# Patient Record
Sex: FEMALE | Race: WHITE | NOT HISPANIC OR LATINO | Employment: OTHER | URBAN - METROPOLITAN AREA
[De-identification: names, ages, dates, MRNs, and addresses within clinical notes are randomized per-mention and may not be internally consistent; named-entity substitution may affect disease eponyms.]

---

## 2020-12-28 ENCOUNTER — CLINICAL SUPPORT (OUTPATIENT)
Dept: AUDIOLOGY | Facility: CLINIC | Age: 85
End: 2020-12-28
Payer: MEDICARE

## 2020-12-28 DIAGNOSIS — H91.90 HEARING LOSS, UNSPECIFIED HEARING LOSS TYPE, UNSPECIFIED LATERALITY: Primary | ICD-10-CM

## 2020-12-28 DIAGNOSIS — H91.90 HEARING LOSS, UNSPECIFIED HEARING LOSS TYPE, UNSPECIFIED LATERALITY: ICD-10-CM

## 2020-12-28 PROCEDURE — 99999 PR PBB SHADOW E&M-EST. PATIENT-LVL I: ICD-10-PCS | Mod: PBBFAC,,, | Performed by: AUDIOLOGIST-HEARING AID FITTER

## 2020-12-28 PROCEDURE — 92557 PR COMPREHENSIVE HEARING TEST: ICD-10-PCS | Mod: S$GLB,,, | Performed by: AUDIOLOGIST-HEARING AID FITTER

## 2020-12-28 PROCEDURE — 99999 PR PBB SHADOW E&M-EST. PATIENT-LVL I: CPT | Mod: PBBFAC,,, | Performed by: AUDIOLOGIST-HEARING AID FITTER

## 2020-12-28 PROCEDURE — 92567 PR TYMPA2METRY: ICD-10-PCS | Mod: S$GLB,,, | Performed by: AUDIOLOGIST-HEARING AID FITTER

## 2020-12-28 PROCEDURE — 92557 COMPREHENSIVE HEARING TEST: CPT | Mod: S$GLB,,, | Performed by: AUDIOLOGIST-HEARING AID FITTER

## 2020-12-28 PROCEDURE — 92567 TYMPANOMETRY: CPT | Mod: S$GLB,,, | Performed by: AUDIOLOGIST-HEARING AID FITTER

## 2020-12-28 NOTE — PROGRESS NOTES
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  Patient complains of hearing loss that has gradually decreased over years. She reports equally sided hearing. No tinnitus. She is accompanied by her daughter who notes the television has been very loud for the past year. No family history of hearing loss. No significant otologic history.     Results reveal a moderate-to-severe sensorineural hearing loss 250-8000 Hz for the right ear, and a mild-to-severe sensorineural hearing loss 250-8000 Hz for the left ear.   Speech Reception Thresholds were 55 dBHL for the right ear and 35 dBHL for the left ear.   Word recognition scores were good for the right ear and good for the left ear.   Tympanograms were Type As for the right ear and Type A for the left ear.    Patient was counseled on the above findings.    Recommendations:  1. Annual audiogram to monitor slight asymmetry  2. Hearing aids, binaural. Patient is provided a copy of her audiogram and notified about [employer].

## 2021-01-29 ENCOUNTER — LAB VISIT (OUTPATIENT)
Dept: FAMILY MEDICINE | Facility: CLINIC | Age: 86
End: 2021-01-29
Payer: MEDICARE

## 2021-01-29 DIAGNOSIS — R05.9 COUGH: ICD-10-CM

## 2021-01-29 PROCEDURE — U0003 INFECTIOUS AGENT DETECTION BY NUCLEIC ACID (DNA OR RNA); SEVERE ACUTE RESPIRATORY SYNDROME CORONAVIRUS 2 (SARS-COV-2) (CORONAVIRUS DISEASE [COVID-19]), AMPLIFIED PROBE TECHNIQUE, MAKING USE OF HIGH THROUGHPUT TECHNOLOGIES AS DESCRIBED BY CMS-2020-01-R: HCPCS

## 2021-01-31 LAB — SARS-COV-2 RNA RESP QL NAA+PROBE: NOT DETECTED

## 2024-05-29 ENCOUNTER — HOSPITAL ENCOUNTER (OUTPATIENT)
Dept: RADIOLOGY | Facility: HOSPITAL | Age: 89
Discharge: HOME | End: 2024-05-29
Attending: STUDENT IN AN ORGANIZED HEALTH CARE EDUCATION/TRAINING PROGRAM
Payer: MEDICARE

## 2024-05-29 ENCOUNTER — OFFICE VISIT (OUTPATIENT)
Dept: RHEUMATOLOGY | Facility: CLINIC | Age: 89
End: 2024-05-29
Payer: MEDICARE

## 2024-05-29 VITALS
DIASTOLIC BLOOD PRESSURE: 68 MMHG | BODY MASS INDEX: 26.49 KG/M2 | WEIGHT: 143.94 LBS | SYSTOLIC BLOOD PRESSURE: 125 MMHG | HEIGHT: 62 IN | HEART RATE: 75 BPM

## 2024-05-29 DIAGNOSIS — M25.50 POLYARTHRALGIA: Primary | ICD-10-CM

## 2024-05-29 DIAGNOSIS — M25.50 POLYARTHRALGIA: ICD-10-CM

## 2024-05-29 DIAGNOSIS — G89.29 CHRONIC PAIN OF BOTH SHOULDERS: ICD-10-CM

## 2024-05-29 DIAGNOSIS — M25.512 CHRONIC PAIN OF BOTH SHOULDERS: ICD-10-CM

## 2024-05-29 DIAGNOSIS — M25.511 CHRONIC PAIN OF BOTH SHOULDERS: ICD-10-CM

## 2024-05-29 DIAGNOSIS — Z79.52 LONG TERM CURRENT USE OF SYSTEMIC STEROIDS: ICD-10-CM

## 2024-05-29 PROCEDURE — 73030 X-RAY EXAM OF SHOULDER: CPT | Mod: 26,50,, | Performed by: RADIOLOGY

## 2024-05-29 PROCEDURE — 77077 JOINT SURVEY SINGLE VIEW: CPT | Mod: 26,,, | Performed by: RADIOLOGY

## 2024-05-29 PROCEDURE — 1157F ADVNC CARE PLAN IN RCRD: CPT | Mod: CPTII,S$GLB,, | Performed by: STUDENT IN AN ORGANIZED HEALTH CARE EDUCATION/TRAINING PROGRAM

## 2024-05-29 PROCEDURE — 73030 X-RAY EXAM OF SHOULDER: CPT | Mod: TC,50

## 2024-05-29 PROCEDURE — 1159F MED LIST DOCD IN RCRD: CPT | Mod: CPTII,S$GLB,, | Performed by: STUDENT IN AN ORGANIZED HEALTH CARE EDUCATION/TRAINING PROGRAM

## 2024-05-29 PROCEDURE — 99205 OFFICE O/P NEW HI 60 MIN: CPT | Mod: S$GLB,,, | Performed by: STUDENT IN AN ORGANIZED HEALTH CARE EDUCATION/TRAINING PROGRAM

## 2024-05-29 PROCEDURE — 77077 JOINT SURVEY SINGLE VIEW: CPT | Mod: TC

## 2024-05-29 PROCEDURE — 1160F RVW MEDS BY RX/DR IN RCRD: CPT | Mod: CPTII,S$GLB,, | Performed by: STUDENT IN AN ORGANIZED HEALTH CARE EDUCATION/TRAINING PROGRAM

## 2024-05-29 PROCEDURE — 99999 PR PBB SHADOW E&M-EST. PATIENT-LVL V: CPT | Mod: PBBFAC,,, | Performed by: STUDENT IN AN ORGANIZED HEALTH CARE EDUCATION/TRAINING PROGRAM

## 2024-05-29 PROCEDURE — 1101F PT FALLS ASSESS-DOCD LE1/YR: CPT | Mod: CPTII,S$GLB,, | Performed by: STUDENT IN AN ORGANIZED HEALTH CARE EDUCATION/TRAINING PROGRAM

## 2024-05-29 PROCEDURE — 1125F AMNT PAIN NOTED PAIN PRSNT: CPT | Mod: CPTII,S$GLB,, | Performed by: STUDENT IN AN ORGANIZED HEALTH CARE EDUCATION/TRAINING PROGRAM

## 2024-05-29 PROCEDURE — 3288F FALL RISK ASSESSMENT DOCD: CPT | Mod: CPTII,S$GLB,, | Performed by: STUDENT IN AN ORGANIZED HEALTH CARE EDUCATION/TRAINING PROGRAM

## 2024-05-29 RX ORDER — CLOPIDOGREL BISULFATE 75 MG/1
75 TABLET ORAL DAILY
COMMUNITY

## 2024-05-29 RX ORDER — PREDNISONE 5 MG/1
1 TABLET ORAL DAILY
COMMUNITY
Start: 2023-10-30 | End: 2024-05-29 | Stop reason: SDUPTHER

## 2024-05-29 RX ORDER — PREDNISONE 5 MG/1
TABLET ORAL
Qty: 150 TABLET | Refills: 1 | Status: SHIPPED | OUTPATIENT
Start: 2024-05-29

## 2024-05-29 RX ORDER — CARVEDILOL 12.5 MG/1
12.5 TABLET ORAL 2 TIMES DAILY
COMMUNITY

## 2024-05-29 RX ORDER — DICLOFENAC SODIUM 10 MG/G
2 GEL TOPICAL 4 TIMES DAILY
Qty: 100 G | Refills: 3 | Status: SHIPPED | OUTPATIENT
Start: 2024-05-29

## 2024-05-29 RX ORDER — ACETAMINOPHEN 500 MG
1000 TABLET ORAL 2 TIMES DAILY
COMMUNITY

## 2024-05-29 RX ORDER — SERTRALINE HYDROCHLORIDE 50 MG/1
50 TABLET, FILM COATED ORAL DAILY
COMMUNITY
Start: 2024-05-07

## 2024-05-29 RX ORDER — AMLODIPINE BESYLATE 2.5 MG/1
2.5 TABLET ORAL DAILY
COMMUNITY

## 2024-05-29 RX ORDER — LOSARTAN POTASSIUM 100 MG/1
100 TABLET ORAL DAILY
COMMUNITY

## 2024-05-29 RX ORDER — ERGOCALCIFEROL 1.25 MG/1
CAPSULE ORAL
COMMUNITY

## 2024-05-29 RX ORDER — ATORVASTATIN CALCIUM 10 MG/1
10 TABLET, FILM COATED ORAL
COMMUNITY
End: 2024-05-29 | Stop reason: ALTCHOICE

## 2024-05-29 RX ORDER — SOTALOL HYDROCHLORIDE 120 MG/1
120 TABLET ORAL 2 TIMES DAILY
COMMUNITY

## 2024-05-29 NOTE — PATIENT INSTRUCTIONS
Try taking Tylenol up to 3000mg daily (from all sources)    Try taking Turmeric (make sure contains black pepper extract)    Try using Voltaren (diclofenac) gel up to four times daily on painful joints

## 2024-05-29 NOTE — PROGRESS NOTES
RHEUMATOLOGY CLINIC INITIAL VISIT    Reason for consult:- polyarthralgias    Chief complaints, HPI, ROS, EXAM, Assessment & Plans:-    Denisa Craft is a 88 y.o. pleasant female who presents to be evaluated for polyarthralgias.  Patient states has had longstanding diagnosis of rheumatoid arthritis.  However she has not been treated with any rheumatoid arthritis medicines.  Has been on varying doses of prednisone for long period of time.  Does seem to help.  Currently she is on prednisone 5 mg daily.  Noticed worsening of her pain especially in her shoulders when she decreased from 10 mg daily.  She does state that she has some stiffness in the morning.  Lasts a couple hours at least.  Pain does seem to get better with activity.  Takes Tylenol which is somewhat helpful.  Of note does have sister who was diagnosed with rheumatoid arthritis.  Today she mainly has pain in her shoulders as well as her neck and arms.  She also has jaw pain.  No visual changes.  No scalp tenderness.  No significant headaches.  No fever or chills.  Has not had bone density.  No falls or fractures.  Rheumatologic review of systems otherwise negative.  No obvious synovitis, dactylitis or enthesitis.  Limited range of motion of shoulders due to pain.  No rashes noted.      Reviewed all available old and outside pertinent medical records.    All lab results personally reviewed and interpreted by me.    1. Polyarthralgia    2. Long term current use of systemic steroids    3. Chronic pain of both shoulders        Problem List Items Addressed This Visit    None  Visit Diagnoses       Polyarthralgia    -  Primary    Relevant Medications    diclofenac sodium (VOLTAREN ARTHRITIS PAIN) 1 % Gel    predniSONE (DELTASONE) 5 MG tablet    Other Relevant Orders    Rheumatoid Factor    Cyclic Citrullinated Peptide Antibody, IgG    C-Reactive Protein    Sedimentation rate    CK    Aldolase    XR Arthritis Survey (Completed)    CBC Auto Differential     Comprehensive Metabolic Panel    HIV 1/2 Ag/Ab (4th Gen)    Hepatitis B surface antigen    HBcAB    Hepatitis B surface antibody    Hepatitis C antibody    Hepatitis A antibody, IgG    Strongyloides IgG Antibodies    Quantiferon Gold TB    X-Ray Shoulder 2 or More views Bilateral (Completed)    Vitamin D    Ambulatory referral/consult to Physical/Occupational Therapy    Long term current use of systemic steroids        Relevant Orders    DXA Bone Density Axial Skeleton 1 or more sites (Completed)    Rheumatoid Factor    Cyclic Citrullinated Peptide Antibody, IgG    C-Reactive Protein    Sedimentation rate    CK    Aldolase    XR Arthritis Survey (Completed)    CBC Auto Differential    Comprehensive Metabolic Panel    HIV 1/2 Ag/Ab (4th Gen)    Hepatitis B surface antigen    HBcAB    Hepatitis B surface antibody    Hepatitis C antibody    Hepatitis A antibody, IgG    Strongyloides IgG Antibodies    Quantiferon Gold TB    X-Ray Shoulder 2 or More views Bilateral (Completed)    Vitamin D    Chronic pain of both shoulders        Relevant Medications    predniSONE (DELTASONE) 5 MG tablet    Other Relevant Orders    Ambulatory referral/consult to Sports Medicine    Ambulatory referral/consult to Physical/Occupational Therapy            Patient presenting to be evaluated for polyarthralgias and previous diagnosis of rheumatoid arthritis  She primarily has pain in her shoulders as well as in her neck and arms.  Has never seen Rheumatology prior  Unclear if her presentation is consistent with rheumatoid arthritis versus osteoarthritis  Some suspicion for polymyalgia rheumatica due to her significant shoulder pain, worse pain in the morning and improvement with prednisone  We will check CBC, CMP, ESR/CRP, RF/CCP, vitamin-D, pre DMARDs as well as CK/aldolase  Obtain x-ray of shoulders bilaterally as well as x-ray arthritis survey  Recommend increasing prednisone to 10 mg and tapering down back to 5 mg over the next  month  Encouraged to use Voltaren gel on painful joints  Referral to physical therapy  Evaluation with ortho/sports Medicine for chronic shoulder pain  may benefit from injections  Bone density    # Follow up in about 3 months (around 8/29/2024).    Chronic comorbid conditions affecting medical decision making today:    Past Medical History:   Diagnosis Date    Coronary artery disease     GERD (gastroesophageal reflux disease)     Hyperlipidemia     Hypertension     Vitamin D deficiency disease        Past Surgical History:   Procedure Laterality Date    abdominal abscess      ANKLE FRACTURE SURGERY Left     APPENDECTOMY      HYSTERECTOMY      INSERTION OF PACEMAKER          Social History     Tobacco Use    Smoking status: Former     Types: Cigarettes    Smokeless tobacco: Never       Family History   Problem Relation Name Age of Onset    Heart disease Mother      Rheum arthritis Sister         Review of patient's allergies indicates:   Allergen Reactions    Penicillins Other (See Comments)       Medication List with Changes/Refills   New Medications    DICLOFENAC SODIUM (VOLTAREN ARTHRITIS PAIN) 1 % GEL    Apply 2 g topically 4 (four) times daily.   Current Medications    ACETAMINOPHEN (TYLENOL) 500 MG TABLET    Take 1,000 mg by mouth 2 (two) times a day.    AMLODIPINE (NORVASC) 2.5 MG TABLET    Take 2.5 mg by mouth once daily.    CARVEDILOL (COREG) 12.5 MG TABLET    Take 12.5 mg by mouth 2 (two) times daily.    CLOPIDOGREL (PLAVIX) 75 MG TABLET    Take 75 mg by mouth once daily.    ERGOCALCIFEROL (ERGOCALCIFEROL) 50,000 UNIT CAP    Take by mouth.    LOSARTAN (COZAAR) 100 MG TABLET    Take 100 mg by mouth once daily.    SERTRALINE (ZOLOFT) 50 MG TABLET    Take 50 mg by mouth once daily.    SOTALOL (BETAPACE) 120 MG TAB    Take 120 mg by mouth 2 (two) times daily.   Changed and/or Refilled Medications    Modified Medication Previous Medication    PREDNISONE (DELTASONE) 5 MG TABLET predniSONE (DELTASONE) 5 MG  tablet       Take 10mg for 2 weeks, then 7.5 mg for 2 weeks and then 5 mg daily thereafter    Take 1 tablet by mouth once daily.   Discontinued Medications    ATORVASTATIN (LIPITOR) 10 MG TABLET    Take 10 mg by mouth.         Disclaimer: This note was prepared using voice recognition system and is likely to have sound alike errors and is not proofread.  Please message me with any questions.    63 minutes of total time spent on the encounter, which includes face to face time and non-face to face time preparing to see the patient (eg, review of tests), Obtaining and/or reviewing separately obtained history, Documenting clinical information in the electronic or other health record, Independently interpreting results (not separately reported) and communicating results to the patient/family/caregiver, or Care coordination (not separately reported).     Thank you for allowing me to participate in the care of Denisa Craft.    Isai Ta MD

## 2024-06-03 DIAGNOSIS — M05.9 SEROPOSITIVE RHEUMATOID ARTHRITIS: Primary | ICD-10-CM

## 2024-06-03 RX ORDER — METHOTREXATE 2.5 MG/1
15 TABLET ORAL
Qty: 24 TABLET | Refills: 3 | Status: SHIPPED | OUTPATIENT
Start: 2024-06-03 | End: 2024-06-06 | Stop reason: SDUPTHER

## 2024-06-03 RX ORDER — FOLIC ACID 1 MG/1
1 TABLET ORAL DAILY
Qty: 90 TABLET | Refills: 3 | Status: SHIPPED | OUTPATIENT
Start: 2024-06-03 | End: 2024-06-06 | Stop reason: SDUPTHER

## 2024-06-04 ENCOUNTER — TELEPHONE (OUTPATIENT)
Dept: RHEUMATOLOGY | Facility: CLINIC | Age: 89
End: 2024-06-04
Payer: MEDICARE

## 2024-06-04 DIAGNOSIS — M81.0 OSTEOPOROSIS, POST-MENOPAUSAL: Primary | ICD-10-CM

## 2024-06-04 DIAGNOSIS — M05.9 SEROPOSITIVE RHEUMATOID ARTHRITIS: ICD-10-CM

## 2024-06-04 NOTE — TELEPHONE ENCOUNTER
----- Message from Isai Ta MD sent at 6/3/2024  3:31 PM CDT -----  Rheumatoid arthritis antibody strongly positive which supports diagnosis of seropositive rheumatoid arthritis.  CRP minimally elevated.  Sed rate is normal.    Recommend starting on methotrexate 15 mg weekly along with daily folic acid.    Eva, would you be able to call and discuss this medication in more depth with the patient?

## 2024-06-04 NOTE — TELEPHONE ENCOUNTER
----- Message from Isai Ta MD sent at 6/4/2024 11:50 AM CDT -----  DEXA shows osteopenia with very high fracture risk.  Would recommend treatment with Prolia every 6 months.    Eva, could you discuss and see if they are amenable to treatment with this?

## 2024-06-05 ENCOUNTER — CLINICAL SUPPORT (OUTPATIENT)
Dept: REHABILITATION | Facility: HOSPITAL | Age: 89
End: 2024-06-05
Attending: STUDENT IN AN ORGANIZED HEALTH CARE EDUCATION/TRAINING PROGRAM
Payer: MEDICARE

## 2024-06-05 DIAGNOSIS — G89.29 CHRONIC PAIN OF BOTH SHOULDERS: ICD-10-CM

## 2024-06-05 DIAGNOSIS — R29.898 UPPER EXTREMITY WEAKNESS: Primary | ICD-10-CM

## 2024-06-05 DIAGNOSIS — M25.511 CHRONIC PAIN OF BOTH SHOULDERS: ICD-10-CM

## 2024-06-05 DIAGNOSIS — M25.50 POLYARTHRALGIA: ICD-10-CM

## 2024-06-05 DIAGNOSIS — M25.612 IMPAIRED RANGE OF MOTION OF BOTH SHOULDERS: ICD-10-CM

## 2024-06-05 DIAGNOSIS — M25.512 CHRONIC PAIN OF BOTH SHOULDERS: ICD-10-CM

## 2024-06-05 DIAGNOSIS — M25.611 IMPAIRED RANGE OF MOTION OF BOTH SHOULDERS: ICD-10-CM

## 2024-06-05 PROCEDURE — 97161 PT EVAL LOW COMPLEX 20 MIN: CPT | Mod: PO

## 2024-06-05 PROCEDURE — 97530 THERAPEUTIC ACTIVITIES: CPT | Mod: PO

## 2024-06-05 NOTE — PLAN OF CARE
[facility]   Physical Therapy Initial Evaluation      Name: Chadd Craft  Clinic Number: [id number]    Therapy Diagnosis:   Encounter Diagnoses   Name Primary?    Polyarthralgia     Chronic pain of both shoulders         Physician: Isai Ta MD    Physician Orders: PT Eval and Treat   Medical Diagnosis from Referral:   M25.50 (ICD-10-CM) - Polyarthralgia   M25.511,G89.29,M25.512 (ICD-10-CM) - Chronic pain of both shoulders     Evaluation Date: 6/5/2024  Authorization Period Expiration: 5/29/2024  Plan of Care Expiration: 7/31/2024  Progress Note Due: 7/5/2024  Date of Surgery: NA  Visit # / Visits authorized: 1/ 1   FOTO: 1/ 3    Precautions: Standard and Fall     Time In: 1510  Time Out: 1600  Total Billable Time: 50 minutes    Subjective   Patient is a poor historian overall    Date of onset: 6 months ago    History of current condition - CHADD reports: a history of B shoulder pain for the past 6 months. She denies any specific NICOLASA to cause the pain. She cannot recall any specific movements to cause the pain but reports her shoulder to be in pain just sitting here for evaluation today.    Falls: None    Imaging: See EPIC:     Prior Therapy: None  Social History: Lives with sister  Occupation: Retired  Prior Level of Function: ind  Current Level of Function: ind    Pain:  Current 5/10, worst 5/10, best 0/10   Location: bilateral shoulders   Description: Aching  Aggravating Factors: unsure  Easing Factors: unsure    Patients goals: Improve comfort     Medical History:   Past Medical History:   Diagnosis Date    Coronary artery disease     GERD (gastroesophageal reflux disease)     Hyperlipidemia     Hypertension     Vitamin D deficiency disease        Surgical History:   Chadd Craft  has a past surgical history that includes Insertion of pacemaker; Hysterectomy; Ankle fracture surgery (Left); abdominal abscess; and Appendectomy.    Medications:   Chadd has a current  medication list which includes the following prescription(s): acetaminophen, amlodipine, carvedilol, clopidogrel, diclofenac sodium, ergocalciferol, folic acid, losartan, methotrexate, prednisone, sertraline, and sotalol.    Allergies:   Review of patient's allergies indicates:   Allergen Reactions    Penicillins Other (See Comments)        Objective          Posture: FHP and increased thoracic kyphosis    Active Range of Motion:   Shoulder Left Right   Flexion 155 150   Abduction 165 165   ER reach T3 T2   IR reach T10 T8     Upper Extremity Strength   (L) UE (R) UE   Shoulder flexion: 4/5 4/5   Shoulder Abduction: 4/5 4/5   Shoulder ER 4/5 4/5   Shoulder IR 4+/5 4+/5         Special Tests:  AC Joint Left Right   Hawkin's Kenndy + +   Neer's Test + +       Joint Mobility:hypomobile    Palpation: TTP B infraspinatus and supraspinatus    Sensation: SILT      Intake Outcome Measure for FOTO shoulder Survey    Therapist reviewed FOTO scores for Chadd Craft on 6/5/2024.   FOTO report - see Media section or FOTO account episode details.    Intake Score: 36% limit         Treatment     Total Treatment time (time-based codes) separate from Evaluation: 15 minutes     CHADD received the treatments listed below:          therapeutic activities to improve functional performance for 15  minutes, including:  Education on HEP to include:  RTC ER isometrics x10 BUE c 3 sec hold  Scapular retractions x30        Patient Education and Home Exercises     Education provided:   - on HEP and POC    Written Home Exercises Provided: yes. Exercises were reviewed and CHADD was able to demonstrate them prior to the end of the session.  CHADD demonstrated good  understanding of the education provided. See EMR under Patient Instructions for exercises provided during therapy sessions.    Assessment     Chadd is a 88 y.o. female referred to outpatient Physical Therapy with a medical diagnosis of   M25.50 (ICD-10-CM) - Polyarthralgia    M25.511,G89.29,M25.512 (ICD-10-CM) - Chronic pain of both shoulders   . Patient presents with increased B shoulder pain and decreased range of motion, strength, and flexibility. These deficits limit the patient from performing everyday activities to include lifting, carrying, bending, reaching, pushing, and pulling without pain or limitations. Pt appears to present with S/S consistent wit B shoulder degenerative changes with mild RTC tendinopathy at this time. Overall, pt with functional range of motion and strength but with mild deficits in each of these areas likely resulting in increased pain. Pt tender to palpation to B infraspinatus and supraspinatus today, suggesting benefit from RTC re-ed. Pt was educate don a HEP to address these deficits and demonstrated good understanding. Due to these deficits, pt will benefit from skilled PT services to improve mobility, control pain, and restore overall function.        Patient prognosis is Fair.   Patient will benefit from skilled outpatient Physical Therapy to address the deficits stated above and in the chart below, provide patient /family education, and to maximize patientt's level of independence.     Plan of care discussed with patient: Yes  Patient's spiritual, cultural and educational needs considered and patient is agreeable to the plan of care and goals as stated below:     Anticipated Barriers for therapy: pmhx    Medical Necessity is demonstrated by the following  History  Co-morbidities and personal factors that may impact the plan of care [] LOW: no personal factors / co-morbidities  [] MODERATE: 1-2 personal factors / co-morbidities  [x] HIGH: 3+ personal factors / co-morbidities    Moderate / High Support Documentation:   Co-morbidities affecting plan of care: pmhx    Personal Factors:   social background     Examination  Body Structures and Functions, activity limitations and participation restrictions that may impact the plan of care [x] LOW:  addressing 1-2 elements  [] MODERATE: 3+ elements  [] HIGH: 4+ elements (please support below)    Moderate / High Support Documentation:      Clinical Presentation [x] LOW: stable  [] MODERATE: Evolving  [] HIGH: Unstable     Decision Making/ Complexity Score: low       Goals:  Short Term Goals: 4 weeks   Pt to report worst pain 3/10 to improve QOL  Pt to improve B shoulder ROM by 10 degs  Pt to improve BUE strength by 1/2 grade  Pt to improve FOTO by 10%    Long Term Goals: 8 weeks   Pt to report worst pain 0/10 to improve QOL  Pt to improve B shoulder ROM by 20 degs  Pt to improve BUE strength by 1 grade  Pt to improve FOTO by 20%  Plan     Plan of care Certification: 6/5/2024 to 7/31/2024.    Outpatient Physical Therapy 1-2 times weekly for 8 weeks to include the following interventions: Cervical/Lumbar Traction, Manual Therapy, Moist Heat/ Ice, Patient Education, Self Care, Therapeutic Activities, and Therapeutic Exercise.     William Solorzano, PT        Physician's Signature: _________________________________________ Date: ________________

## 2024-06-06 RX ORDER — METHOTREXATE 2.5 MG/1
15 TABLET ORAL
Qty: 24 TABLET | Refills: 3 | Status: SHIPPED | OUTPATIENT
Start: 2024-06-06

## 2024-06-06 RX ORDER — FOLIC ACID 1 MG/1
1 TABLET ORAL DAILY
Qty: 90 TABLET | Refills: 3 | Status: SHIPPED | OUTPATIENT
Start: 2024-06-06

## 2024-06-06 NOTE — TELEPHONE ENCOUNTER
Outgoing call to patient's daughter.   Extensive counseling on Prolia, MTX, folic acid.   Patient's daughter requested dispensing pharmacy changed to [facility].   Prolia estimate request sent to [facility]. Awaiting response.

## 2024-06-07 NOTE — TELEPHONE ENCOUNTER
estimate for Prolia: The estimated patient responsibility for the below services are $309.14.   Rx for Prolia routed to [facility].     Of note, patient's daughter wants to ensure mom's dentures fit well etc prior to moving forward w/ Prolia.

## 2024-06-11 ENCOUNTER — CLINICAL SUPPORT (OUTPATIENT)
Dept: REHABILITATION | Facility: HOSPITAL | Age: 89
End: 2024-06-11
Payer: MEDICARE

## 2024-06-11 DIAGNOSIS — M25.612 IMPAIRED RANGE OF MOTION OF BOTH SHOULDERS: ICD-10-CM

## 2024-06-11 DIAGNOSIS — M25.611 IMPAIRED RANGE OF MOTION OF BOTH SHOULDERS: ICD-10-CM

## 2024-06-11 DIAGNOSIS — R29.898 UPPER EXTREMITY WEAKNESS: Primary | ICD-10-CM

## 2024-06-11 PROCEDURE — 97112 NEUROMUSCULAR REEDUCATION: CPT | Mod: PO,CQ

## 2024-06-11 PROCEDURE — 97110 THERAPEUTIC EXERCISES: CPT | Mod: PO,CQ

## 2024-06-11 NOTE — PROGRESS NOTES
Physical Therapy Daily Treatment Note     Name: Chadd [facility] Number: [id number]    Therapy Diagnosis:   Encounter Diagnoses   Name Primary?    Upper extremity weakness Yes    Impaired range of motion of both shoulders      Physician: Isai Ta MD    Visit Date: 6/11/2024    Physician Orders: PT Eval and Treat   Medical Diagnosis from Referral:   M25.50 (ICD-10-CM) - Polyarthralgia   M25.511,G89.29,M25.512 (ICD-10-CM) - Chronic pain of both shoulders      Evaluation Date: 6/5/2024  Authorization Period Expiration: 5/29/2024  Plan of Care Expiration: 7/31/2024  Progress Note Due: 7/5/2024  Date of Surgery: NA  Visit # / Visits authorized: 1/ 20   FOTO: 1/ 3    Time In: 3:50 pm  Time Out: 4:45 pm  Total Billable Time: 25 minutes 1:1    Precautions: Standard and Fall     Subjective     Pt reports: moderate bilateral shoulder pain upon arrival for first follow up. She notes shoulder isometric HEP exercise resulted in min increase in shoulder discomfort/muscular soreness.    She was compliant with home exercise program.  Response to previous treatment: First follow up  Functional change: TBD    Pain: 5/10  Location: bilateral shoulder      Objective     Objective measures displayed on progress notes unless otherwise noted      Treatment      CHADD received therapeutic exercises to develop strength, endurance, ROM, flexibility, posture and core stabilization for 40 minutes including:    Nu step level 1 x 6 minutes  Pulleys x 2 minutes ea (flex/abd)   Supine Shoulder flex AAROM w/dowel 2 x 10  Supine shoulder abd AAROM wdowel 2 x 10  Supine shoulder ER AAROM w/dowel 2 x 10 ea  Supine pec stretch over towel roll x 2 minutes  Supine thoracic ext stretch over towel roll x 2 minutes      CHADD participated in neuromuscular re-education activities to improve: Balance, Coordination, Proprioception and Posture for 10 minutes. The following activities were included:    Scap retraction x 30   Shoulder isometrics  "w/towel x 5 5" hold ea      DENISA received cold pack for 5 minutes to bilateral shoulders.      Home Exercises Provided and Patient Education Provided     Education provided:   - HEP review    Written Home Exercises Provided: Patient instructed to cont prior HEP.  Exercises were reviewed and DENISA was able to demonstrate them prior to the end of the session.  DENISA demonstrated good  understanding of the education provided.     See EMR under Patient Instructions for exercises provided prior visit.    Assessment     Denisa returned for her first follow up visit reporting moderate bilateral shoulder pain levels. Treatment began with HEP review with additional shoulder mobility and periscapular strengthening exercises. Pt required min verbal cueing to ensure proper form on most exercises. Pt noted difficulty performing shoulder isometrics. Will monitor and attempt to progress per pt's tolerance.    DENISA Is progressing well towards her goals.   Pt prognosis is Fair.     Pt will continue to benefit from skilled outpatient physical therapy to address the deficits listed in the problem list box on initial evaluation, provide pt/family education and to maximize pt's level of independence in the home and community environment.     Pt's spiritual, cultural and educational needs considered and pt agreeable to plan of care and goals.     Anticipated barriers to physical therapy: pmhx    Goals:   Short Term Goals: 4 weeks   Pt to report worst pain 3/10 to improve QOL  Pt to improve B shoulder ROM by 10 degs  Pt to improve BUE strength by 1/2 grade  Pt to improve FOTO by 10%     Long Term Goals: 8 weeks   Pt to report worst pain 0/10 to improve QOL  Pt to improve B shoulder ROM by 20 degs  Pt to improve BUE strength by 1 grade  Pt to improve FOTO by 20%    Plan     Plan of care Certification: 6/5/2024 to 7/31/2024.     Outpatient Physical Therapy 1-2 times weekly for 8 weeks to include the following interventions: " Cervical/Lumbar Traction, Manual Therapy, Moist Heat/ Ice, Patient Education, Self Care, Therapeutic Activities, and Therapeutic Exercise.     Bob Sweet, PTA

## 2024-06-14 ENCOUNTER — CLINICAL SUPPORT (OUTPATIENT)
Dept: REHABILITATION | Facility: HOSPITAL | Age: 89
End: 2024-06-14
Payer: MEDICARE

## 2024-06-14 DIAGNOSIS — R29.898 UPPER EXTREMITY WEAKNESS: Primary | ICD-10-CM

## 2024-06-14 DIAGNOSIS — M25.611 IMPAIRED RANGE OF MOTION OF BOTH SHOULDERS: ICD-10-CM

## 2024-06-14 DIAGNOSIS — M25.612 IMPAIRED RANGE OF MOTION OF BOTH SHOULDERS: ICD-10-CM

## 2024-06-14 PROBLEM — M81.0 OSTEOPOROSIS, POST-MENOPAUSAL: Status: ACTIVE | Noted: 2024-06-14

## 2024-06-14 PROCEDURE — 97140 MANUAL THERAPY 1/> REGIONS: CPT | Mod: PO,CQ

## 2024-06-14 PROCEDURE — 97110 THERAPEUTIC EXERCISES: CPT | Mod: PO,CQ

## 2024-06-14 NOTE — PROGRESS NOTES
"Physical Therapy Daily Treatment Note     Name: Chadd Hurst [place]  Clinic Number: [id number]    Therapy Diagnosis:   Encounter Diagnoses   Name Primary?    Upper extremity weakness Yes    Impaired range of motion of both shoulders      Physician: Isai Ta MD    Visit Date: 6/14/2024    Physician Orders: PT Eval and Treat   Medical Diagnosis from Referral:   M25.50 (ICD-10-CM) - Polyarthralgia   M25.511,G89.29,M25.512 (ICD-10-CM) - Chronic pain of both shoulders      Evaluation Date: 6/5/2024  Authorization Period Expiration: 5/29/2024  Plan of Care Expiration: 7/31/2024  Progress Note Due: 7/5/2024  Date of Surgery: NA  Visit # / Visits authorized: 2/ 20   FOTO: 1/ 3    Time In: 1:30 pm  Time Out: 2:30 pm  Total Billable Time: 27 minutes 1:1    Precautions: Standard and Fall     Subjective     Pt reports: slight increase in B shoulder pain level following first follow up visit.     She was compliant with home exercise program.  Response to previous treatment: mod increase in muscular soreness  Functional change: TBD    Pain: 5/10  Location: bilateral shoulder      Objective     Objective measures displayed on progress notes unless otherwise noted      Treatment      CHADD received therapeutic exercises to develop strength, endurance, ROM, flexibility, posture and core stabilization for 30 minutes including:    Nu step level 1 x 6 minutes  Pulleys x 2 minutes ea (flex/abd)   Supine Shoulder flex AAROM w/dowel 2 x 10  Supine shoulder abd AAROM wdowel 2 x 10-NT  Supine shoulder ER AAROM w/dowel 2 x 10 ea  Supine pec stretch over towel roll x 2 minutes  Supine thoracic ext stretch over towel roll x 2 minutes      CHADD participated in neuromuscular re-education activities to improve: Balance, Coordination, Proprioception and Posture for 10 minutes. The following activities were included:    Scap retraction x 30   Shoulder isometrics w/towel x 5 5" hold ea      CHADD received the following manual therapy " techniques: Joint mobilizations and Soft tissue Mobilization were applied to the: B shoulder  for 15 minutes, including:    Gentle G/H distraction w/oscillations  STM to Pec Minor  G/H inferior joint mobs      DENISA received hot pack for 5 minutes to bilateral shoulders.      Home Exercises Provided and Patient Education Provided     Education provided:   - HEP review    Written Home Exercises Provided: Patient instructed to cont prior HEP.  Exercises were reviewed and DENISA was able to demonstrate them prior to the end of the session.  DENISA demonstrated good  understanding of the education provided.     See EMR under Patient Instructions for exercises provided prior visit.    Assessment   Denisa returned reporting slight increase in B shoulder pain levels and muscular soreness following exercise progressions made during first follow up visit. Increased emphasis on pain control with the addition of STM  to pec minor and joint mobilizations to B G/H joints. Pt reported decreased pain levels post treatment. Will monitor and attempt to progress per pt's tolerance.    DENISA Is progressing well towards her goals.   Pt prognosis is Fair.     Pt will continue to benefit from skilled outpatient physical therapy to address the deficits listed in the problem list box on initial evaluation, provide pt/family education and to maximize pt's level of independence in the home and community environment.     Pt's spiritual, cultural and educational needs considered and pt agreeable to plan of care and goals.     Anticipated barriers to physical therapy: pmhx    Goals:   Short Term Goals: 4 weeks   Pt to report worst pain 3/10 to improve QOL  Pt to improve B shoulder ROM by 10 degs  Pt to improve BUE strength by 1/2 grade  Pt to improve FOTO by 10%     Long Term Goals: 8 weeks   Pt to report worst pain 0/10 to improve QOL  Pt to improve B shoulder ROM by 20 degs  Pt to improve BUE strength by 1 grade  Pt to improve FOTO by  20%    Plan     Plan of care Certification: 6/5/2024 to 7/31/2024.     Outpatient Physical Therapy 1-2 times weekly for 8 weeks to include the following interventions: Cervical/Lumbar Traction, Manual Therapy, Moist Heat/ Ice, Patient Education, Self Care, Therapeutic Activities, and Therapeutic Exercise.     Bob Sweet, PTA

## 2024-06-17 NOTE — TELEPHONE ENCOUNTER
Outgoing call to schedule Prolia. Pt to have repeat CMP collected 6/24 @ 0900 and Prolia admin pending labs @ 1030.

## 2024-06-18 ENCOUNTER — CLINICAL SUPPORT (OUTPATIENT)
Dept: REHABILITATION | Facility: HOSPITAL | Age: 89
End: 2024-06-18
Payer: MEDICARE

## 2024-06-18 DIAGNOSIS — R29.898 UPPER EXTREMITY WEAKNESS: Primary | ICD-10-CM

## 2024-06-18 DIAGNOSIS — M25.612 IMPAIRED RANGE OF MOTION OF BOTH SHOULDERS: ICD-10-CM

## 2024-06-18 DIAGNOSIS — M25.611 IMPAIRED RANGE OF MOTION OF BOTH SHOULDERS: ICD-10-CM

## 2024-06-18 PROCEDURE — 97110 THERAPEUTIC EXERCISES: CPT | Mod: PO

## 2024-06-18 PROCEDURE — 97112 NEUROMUSCULAR REEDUCATION: CPT | Mod: PO

## 2024-06-18 NOTE — PROGRESS NOTES
"Physical Therapy Daily Treatment Note     Name: Chadd Hurst [place]  Clinic Number: [id number]    Therapy Diagnosis:   Encounter Diagnoses   Name Primary?    Upper extremity weakness Yes    Impaired range of motion of both shoulders      Physician: Isai Ta MD    Visit Date: 6/18/2024    Physician Orders: PT Eval and Treat   Medical Diagnosis from Referral:   M25.50 (ICD-10-CM) - Polyarthralgia   M25.511,G89.29,M25.512 (ICD-10-CM) - Chronic pain of both shoulders      Evaluation Date: 6/5/2024  Authorization Period Expiration: 5/29/2024  Plan of Care Expiration: 7/31/2024  Progress Note Due: 7/5/2024  Date of Surgery: NA  Visit # / Visits authorized: 3/ 20   FOTO: 1/ 3    Time In: 1600 pm  Time Out: 1658 pm  Total Billable Time: 30 minutes 1:1    Precautions: Standard and Fall     Subjective     Pt reports: slight increase in B shoulder pain as sessions continue    She was compliant with home exercise program.  Response to previous treatment: mod increase in muscular soreness  Functional change: TBD    Pain: 5/10  Location: bilateral shoulder      Objective     Objective measures displayed on progress notes unless otherwise noted      Treatment      CHADD received therapeutic exercises to develop strength, endurance, ROM, flexibility, posture and core stabilization for 30 minutes including:    Nu step level 1 x 6 minutes  Pulleys x 2 minutes ea (flex/abd)   Supine Shoulder flex AAROM w/dowel 2 x 10  Supine shoulder abd AAROM wdowel 2 x 10-NT  Supine shoulder ER AAROM w/dowel 2 x 10 ea  Supine pec stretch over towel roll x 2 minutes  Supine thoracic ext stretch over towel roll x 2 minutes      CHADD participated in neuromuscular re-education activities to improve: Balance, Coordination, Proprioception and Posture for 10 minutes. The following activities were included:    Scap retraction x 30   Shoulder isometrics w/towel x 5 5" hold ea      CHADD received the following manual therapy techniques: Joint " mobilizations and Soft tissue Mobilization were applied to the: B shoulder  for 15 minutes, including:    Gentle G/H distraction w/oscillations  STM to Pec Minor  G/H inferior joint mobs      DENISA received hot pack for 5 minutes to bilateral shoulders.      Home Exercises Provided and Patient Education Provided     Education provided:   - HEP review    Written Home Exercises Provided: Patient instructed to cont prior HEP.  Exercises were reviewed and DENISA was able to demonstrate them prior to the end of the session.  DENISA demonstrated good  understanding of the education provided.     See EMR under Patient Instructions for exercises provided prior visit.    Assessment   Denisa continues to demonstrate signs of improvement as sessions continue with increased tolerance to overhead activities. Pt will continue to benefit from further loading to tolerance going forward to maximize ability to perform ADLs and overhead activities.    DENISA Is progressing well towards her goals.   Pt prognosis is Fair.     Pt will continue to benefit from skilled outpatient physical therapy to address the deficits listed in the problem list box on initial evaluation, provide pt/family education and to maximize pt's level of independence in the home and community environment.     Pt's spiritual, cultural and educational needs considered and pt agreeable to plan of care and goals.     Anticipated barriers to physical therapy: pmhx    Goals:   Short Term Goals: 4 weeks   Pt to report worst pain 3/10 to improve QOL  Pt to improve B shoulder ROM by 10 degs  Pt to improve BUE strength by 1/2 grade  Pt to improve FOTO by 10%     Long Term Goals: 8 weeks   Pt to report worst pain 0/10 to improve QOL  Pt to improve B shoulder ROM by 20 degs  Pt to improve BUE strength by 1 grade  Pt to improve FOTO by 20%    Plan     Plan of care Certification: 6/5/2024 to 7/31/2024.     Outpatient Physical Therapy 1-2 times weekly for 8 weeks to include the  following interventions: Cervical/Lumbar Traction, Manual Therapy, Moist Heat/ Ice, Patient Education, Self Care, Therapeutic Activities, and Therapeutic Exercise.     William Solorzano, PT

## 2024-06-21 ENCOUNTER — CLINICAL SUPPORT (OUTPATIENT)
Dept: REHABILITATION | Facility: HOSPITAL | Age: 89
End: 2024-06-21
Payer: MEDICARE

## 2024-06-21 DIAGNOSIS — M25.612 IMPAIRED RANGE OF MOTION OF BOTH SHOULDERS: ICD-10-CM

## 2024-06-21 DIAGNOSIS — R29.898 UPPER EXTREMITY WEAKNESS: Primary | ICD-10-CM

## 2024-06-21 DIAGNOSIS — M25.611 IMPAIRED RANGE OF MOTION OF BOTH SHOULDERS: ICD-10-CM

## 2024-06-21 PROCEDURE — 97110 THERAPEUTIC EXERCISES: CPT | Mod: PO,CQ

## 2024-06-21 PROCEDURE — 97112 NEUROMUSCULAR REEDUCATION: CPT | Mod: PO,CQ

## 2024-06-21 NOTE — PROGRESS NOTES
"Physical Therapy Daily Treatment Note     Name: Chadd Hurst [place]  Clinic Number: [id number]    Therapy Diagnosis:   Encounter Diagnoses   Name Primary?    Upper extremity weakness Yes    Impaired range of motion of both shoulders      Physician: Isai Ta MD    Visit Date: 6/21/2024    Physician Orders: PT Eval and Treat   Medical Diagnosis from Referral:   M25.50 (ICD-10-CM) - Polyarthralgia   M25.511,G89.29,M25.512 (ICD-10-CM) - Chronic pain of both shoulders      Evaluation Date: 6/5/2024  Authorization Period Expiration: 5/29/2024  Plan of Care Expiration: 7/31/2024  Progress Note Due: 7/5/2024  Date of Surgery: NA  Visit # / Visits authorized: 4/ 20   FOTO: 1/ 3    Time In: 1:30 pm  Time Out: 2:25 pm  Total Billable Time: 25 minutes 1:1    Precautions: Standard and Fall     Subjective     Pt reports: "my shoulders are tender". She does note improved functional shoulder movement at home.    She was compliant with home exercise program.  Response to previous treatment: mod increase in muscular soreness  Functional change: TBD    Pain: 5/10  Location: bilateral shoulder      Objective     Objective measures displayed on progress notes unless otherwise noted      Treatment      CHADD received therapeutic exercises to develop strength, endurance, ROM, flexibility, posture and core stabilization for 35 minutes including:    Nu step level 1 x 6 minutes  Pulleys x 2 minutes ea (flex/abd)   Supine Shoulder flex AAROM w/dowel 2 x 10  Supine shoulder abd AAROM wdowel 2 x 10-NT  Supine shoulder ER AAROM w/dowel 2 x 10 ea  Supine pec stretch over towel roll x 2 minutes  Supine thoracic ext stretch over towel roll x 2 minutes      CHADD participated in neuromuscular re-education activities to improve: Balance, Coordination, Proprioception and Posture for 15 minutes. The following activities were included:    Scap retraction x 30   Shoulder isometrics w/towel x 5 5" hold ea      CHADD received the following manual " therapy techniques: Joint mobilizations and Soft tissue Mobilization were applied to the: B shoulder  for 00 minutes, including:    Gentle G/H distraction w/oscillations  STM to Pec Minor  G/H inferior joint mobs      DENISA received hot pack for 5 minutes to bilateral shoulders.      Home Exercises Provided and Patient Education Provided     Education provided:   - HEP review    Written Home Exercises Provided: Patient instructed to cont prior HEP.  Exercises were reviewed and DENISA was able to demonstrate them prior to the end of the session.  DENISA demonstrated good  understanding of the education provided.     See EMR under Patient Instructions for exercises provided prior visit.    Assessment   Denisa returned reporting continued bilateral shoulder pain levels. Shoulder/periscapular mobility and strengthening exercises continued with pt continuing to require max verbal and tactile cueing to ensure proper form on all exercises. Moist hot pack was applied with pt noting good relief with this intervention. Will continue to progress per pt's tolerance.    DENISA Is progressing well towards her goals.   Pt prognosis is Fair.     Pt will continue to benefit from skilled outpatient physical therapy to address the deficits listed in the problem list box on initial evaluation, provide pt/family education and to maximize pt's level of independence in the home and community environment.     Pt's spiritual, cultural and educational needs considered and pt agreeable to plan of care and goals.     Anticipated barriers to physical therapy: pmhx    Goals:   Short Term Goals: 4 weeks   Pt to report worst pain 3/10 to improve QOL  Pt to improve B shoulder ROM by 10 degs  Pt to improve BUE strength by 1/2 grade  Pt to improve FOTO by 10%     Long Term Goals: 8 weeks   Pt to report worst pain 0/10 to improve QOL  Pt to improve B shoulder ROM by 20 degs  Pt to improve BUE strength by 1 grade  Pt to improve FOTO by 20%    Plan      Plan of care Certification: 6/5/2024 to 7/31/2024.     Outpatient Physical Therapy 1-2 times weekly for 8 weeks to include the following interventions: Cervical/Lumbar Traction, Manual Therapy, Moist Heat/ Ice, Patient Education, Self Care, Therapeutic Activities, and Therapeutic Exercise.     Bob Sweet, PTA

## 2024-06-24 ENCOUNTER — CLINICAL SUPPORT (OUTPATIENT)
Dept: RHEUMATOLOGY | Facility: CLINIC | Age: 89
End: 2024-06-24
Payer: MEDICARE

## 2024-06-24 ENCOUNTER — LAB VISIT (OUTPATIENT)
Dept: LAB | Facility: HOSPITAL | Age: 89
End: 2024-06-24
Attending: STUDENT IN AN ORGANIZED HEALTH CARE EDUCATION/TRAINING PROGRAM
Payer: MEDICARE

## 2024-06-24 ENCOUNTER — TELEPHONE (OUTPATIENT)
Dept: RHEUMATOLOGY | Facility: CLINIC | Age: 89
End: 2024-06-24
Payer: MEDICARE

## 2024-06-24 DIAGNOSIS — M05.9 SEROPOSITIVE RHEUMATOID ARTHRITIS: Primary | ICD-10-CM

## 2024-06-24 DIAGNOSIS — M81.0 OSTEOPOROSIS, POST-MENOPAUSAL: ICD-10-CM

## 2024-06-24 DIAGNOSIS — M25.50 POLYARTHRALGIA: ICD-10-CM

## 2024-06-24 LAB
ALBUMIN SERPL BCP-MCNC: 3.2 G/DL (ref 3.5–5.2)
ALP SERPL-CCNC: 56 U/L (ref 55–135)
ALT SERPL W/O P-5'-P-CCNC: 9 U/L (ref 10–44)
ANION GAP SERPL CALC-SCNC: 10 MMOL/L (ref 8–16)
AST SERPL-CCNC: 13 U/L (ref 10–40)
BILIRUB SERPL-MCNC: 0.3 MG/DL (ref 0.1–1)
BUN SERPL-MCNC: 13 MG/DL (ref 8–23)
CALCIUM SERPL-MCNC: 9.2 MG/DL (ref 8.7–10.5)
CHLORIDE SERPL-SCNC: 106 MMOL/L (ref 95–110)
CO2 SERPL-SCNC: 24 MMOL/L (ref 23–29)
CREAT SERPL-MCNC: 0.7 MG/DL (ref 0.5–1.4)
EST. GFR  (NO RACE VARIABLE): >60 ML/MIN/1.73 M^2
GLUCOSE SERPL-MCNC: 94 MG/DL (ref 70–110)
POTASSIUM SERPL-SCNC: 4.1 MMOL/L (ref 3.5–5.1)
PROT SERPL-MCNC: 6.2 G/DL (ref 6–8.4)
SODIUM SERPL-SCNC: 140 MMOL/L (ref 136–145)

## 2024-06-24 PROCEDURE — 80053 COMPREHEN METABOLIC PANEL: CPT | Performed by: STUDENT IN AN ORGANIZED HEALTH CARE EDUCATION/TRAINING PROGRAM

## 2024-06-24 PROCEDURE — 96372 THER/PROPH/DIAG INJ SC/IM: CPT | Mod: S$GLB,,, | Performed by: PHARMACIST

## 2024-06-24 PROCEDURE — 36415 COLL VENOUS BLD VENIPUNCTURE: CPT | Performed by: STUDENT IN AN ORGANIZED HEALTH CARE EDUCATION/TRAINING PROGRAM

## 2024-06-24 RX ORDER — CELECOXIB 100 MG/1
100 CAPSULE ORAL DAILY
Qty: 30 CAPSULE | Refills: 0 | Status: SHIPPED | OUTPATIENT
Start: 2024-06-24

## 2024-06-24 NOTE — TELEPHONE ENCOUNTER
----- Message from Trinidad Lainez sent at 6/24/2024  9:54 AM CDT -----  Regarding: Patient advice                Name of Who is Calling:    Analilia Randall  (Patient's Daughter)    Who Left The message:    Analilia Randall  (Patient's Daughter)        What is the request in detail:     Patient's daughter called requesting a call regarding her Mother's appointment with the nurse today 06/24/2024.    Please further advise.    Thank you      Reply by [facility]:  YES      Preferred Call Back :  [phone] (m)

## 2024-06-24 NOTE — PROGRESS NOTES
Prolia 60 mg q 6 months  Last dose given- first dose       Any invasive dental procedures in past 3 months or upcoming 3 months: no     Last Rheumatology provider visit- 5/29/24     Recent labs? Date: 06/24/2024      Calcium: 9.2    Vitamin D: 34 on 5/29/24   SCr/CrCl: 0.7      NDC: 00860-500-49  Lot #- [id number]              Expiration Date- 06/30/2026         Prolia 60 mg/ml administered SQ to lower left quadrant. Tolerated without any complaints. No redness, swelling, or drainage noted to site. Instructed to remain in clinic 15 minutes after administration to monitor for any sign/symptom of reaction. Patient instructed on signs and symptoms of reaction to report. Verbalizes understanding.

## 2024-06-24 NOTE — TELEPHONE ENCOUNTER
Outgoing call to patient's daughter. They were informed the lab would take 24 hours.     Outgoing call to lab to update priority to stat. Informed there is a 1 hour turnaround for the hospital lab once kenia Pickering finds the sample.     Outgoing call to patient's daughter re: will call in for appt once the CMP results.

## 2024-06-24 NOTE — TELEPHONE ENCOUNTER
Calcium: 9.2   OK to administer Prolia. Outgoing call to patient's daughter to inform. Left vm.

## 2024-06-25 ENCOUNTER — CLINICAL SUPPORT (OUTPATIENT)
Dept: REHABILITATION | Facility: HOSPITAL | Age: 89
End: 2024-06-25
Payer: MEDICARE

## 2024-06-25 DIAGNOSIS — R29.898 UPPER EXTREMITY WEAKNESS: Primary | ICD-10-CM

## 2024-06-25 DIAGNOSIS — M25.612 IMPAIRED RANGE OF MOTION OF BOTH SHOULDERS: ICD-10-CM

## 2024-06-25 DIAGNOSIS — M25.611 IMPAIRED RANGE OF MOTION OF BOTH SHOULDERS: ICD-10-CM

## 2024-06-25 PROCEDURE — 97140 MANUAL THERAPY 1/> REGIONS: CPT | Mod: PO

## 2024-06-25 PROCEDURE — 97112 NEUROMUSCULAR REEDUCATION: CPT | Mod: PO

## 2024-06-25 PROCEDURE — 97110 THERAPEUTIC EXERCISES: CPT | Mod: PO

## 2024-06-25 NOTE — PROGRESS NOTES
"Physical Therapy Daily Treatment Note     Name: Chadd Hurst [place]  Clinic Number: [id number]    Therapy Diagnosis:   Encounter Diagnoses   Name Primary?    Upper extremity weakness Yes    Impaired range of motion of both shoulders      Physician: Isai Ta MD    Visit Date: 6/25/2024    Physician Orders: PT Eval and Treat   Medical Diagnosis from Referral:   M25.50 (ICD-10-CM) - Polyarthralgia   M25.511,G89.29,M25.512 (ICD-10-CM) - Chronic pain of both shoulders      Evaluation Date: 6/5/2024  Authorization Period Expiration: 5/29/2024  Plan of Care Expiration: 7/31/2024  Progress Note Due: 7/5/2024  Date of Surgery: NA  Visit # / Visits authorized: 5/ 20   FOTO: 1/ 3    Time In: 1:00 pm  Time Out: 1:53 pm  Total Billable Time: 53 minutes 1:1    Precautions: Standard and Fall     Subjective     Pt reports: continued B shoulder pain and feels they are "bone on bone."    She was compliant with home exercise program.  Response to previous treatment: mod increase in muscular soreness  Functional change: TBD    Pain: 5/10  Location: bilateral shoulder      Objective     Objective measures displayed on progress notes unless otherwise noted      Treatment      CHADD received therapeutic exercises to develop strength, endurance, ROM, flexibility, posture and core stabilization for 28 minutes including:    Nu step level 1 x 6 minutes  Pulleys x 2 minutes ea (flex/abd)   Supine Shoulder flex AAROM w/dowel 2 x 10  Supine shoulder abd AAROM wdowel 2 x 10-NT  Supine shoulder ER AAROM w/dowel 2 x 10 ea  Supine pec stretch over towel roll x 2 minutes  Supine thoracic ext stretch over towel roll x 2 minutes  Supine serratus punch 2x10 3# dowel      CHADD participated in neuromuscular re-education activities to improve: Balance, Coordination, Proprioception and Posture for 15 minutes. The following activities were included:    Scap retraction x 30   Shoulder isometrics w/towel x 5 5" hold ea  Landmine press c 3# dowel " 3x10      CHADD received the following manual therapy techniques: Joint mobilizations and Soft tissue Mobilization were applied to the: B shoulder  for 10 minutes, including:    Gentle G/H distraction w/oscillations  STM to Pec Minor  G/H inferior joint mobs      CHADD received hot pack for 0 minutes to bilateral shoulders.      Home Exercises Provided and Patient Education Provided     Education provided:   - HEP review    Written Home Exercises Provided: Patient instructed to cont prior HEP.  Exercises were reviewed and CHADD was able to demonstrate them prior to the end of the session.  CHADD demonstrated good  understanding of the education provided.     See EMR under Patient Instructions for exercises provided prior visit.    Assessment   Pt continues to be limited by B shoulder pain even with very light mat level exercises without resistance. At this time, progressions limited by tolerance to loading. Pt will benefit from re-assessment at upcoming visit to determining meeting of goals and will follow back up with MD if goals not met.    CHADD Is progressing well towards her goals.   Pt prognosis is Fair.     Pt will continue to benefit from skilled outpatient physical therapy to address the deficits listed in the problem list box on initial evaluation, provide pt/family education and to maximize pt's level of independence in the home and community environment.     Pt's spiritual, cultural and educational needs considered and pt agreeable to plan of care and goals.     Anticipated barriers to physical therapy: pmhx    Goals:   Short Term Goals: 4 weeks   Pt to report worst pain 3/10 to improve QOL  Pt to improve B shoulder ROM by 10 degs  Pt to improve BUE strength by 1/2 grade  Pt to improve FOTO by 10%     Long Term Goals: 8 weeks   Pt to report worst pain 0/10 to improve QOL  Pt to improve B shoulder ROM by 20 degs  Pt to improve BUE strength by 1 grade  Pt to improve FOTO by 20%    Plan     Plan of  care Certification: 6/5/2024 to 7/31/2024.     Outpatient Physical Therapy 1-2 times weekly for 8 weeks to include the following interventions: Cervical/Lumbar Traction, Manual Therapy, Moist Heat/ Ice, Patient Education, Self Care, Therapeutic Activities, and Therapeutic Exercise.     William Solorzano, PT

## 2024-07-02 ENCOUNTER — CLINICAL SUPPORT (OUTPATIENT)
Dept: REHABILITATION | Facility: HOSPITAL | Age: 89
End: 2024-07-02
Payer: MEDICARE

## 2024-07-02 DIAGNOSIS — M25.612 IMPAIRED RANGE OF MOTION OF BOTH SHOULDERS: ICD-10-CM

## 2024-07-02 DIAGNOSIS — M25.611 IMPAIRED RANGE OF MOTION OF BOTH SHOULDERS: ICD-10-CM

## 2024-07-02 DIAGNOSIS — R29.898 UPPER EXTREMITY WEAKNESS: Primary | ICD-10-CM

## 2024-07-02 PROCEDURE — 97110 THERAPEUTIC EXERCISES: CPT | Mod: PO,CQ

## 2024-07-02 PROCEDURE — 97140 MANUAL THERAPY 1/> REGIONS: CPT | Mod: PO,CQ

## 2024-07-02 NOTE — PROGRESS NOTES
"Physical Therapy Daily Treatment Note     Name: Chadd Hurst [place]  Clinic Number: [id number]    Therapy Diagnosis:   Encounter Diagnoses   Name Primary?    Upper extremity weakness Yes    Impaired range of motion of both shoulders      Physician: Isai Ta MD    Visit Date: 7/2/2024    Physician Orders: PT Eval and Treat   Medical Diagnosis from Referral:   M25.50 (ICD-10-CM) - Polyarthralgia   M25.511,G89.29,M25.512 (ICD-10-CM) - Chronic pain of both shoulders      Evaluation Date: 6/5/2024  Authorization Period Expiration: 5/29/2024  Plan of Care Expiration: 7/31/2024  Progress Note Due: 7/5/2024  Date of Surgery: NA  Visit # / Visits authorized: 6/ 20   FOTO: 1/ 3    Time In: 2:25 pm (late arrival)  Time Out: 3:10 pm  Total Billable Time: 45 minutes     Precautions: Standard and Fall     Subjective     Pt reports: continued B shoulder pain, stating "it still hurts but I'm tolerating it".    She was compliant with home exercise program.  Response to previous treatment: mod increase in muscular soreness  Functional change: TBD    Pain: 5/10  Location: bilateral shoulder      Objective     Objective measures displayed on progress notes unless otherwise noted      Treatment      CHADD received therapeutic exercises to develop strength, endurance, ROM, flexibility, posture and core stabilization for 30 minutes including:    Nu step level 1 x 3 minutes  Pulleys x 2 minutes ea (flex/abd)   Supine Shoulder flex AAROM w/dowel 2 x 10 ( increased pain today)  Supine shoulder abd AAROM wdowel 2 x 10-NT  Supine shoulder ER AAROM w/dowel 2 x 10 ea  Supine pec stretch over towel roll x 2 minutes  Supine thoracic ext stretch over towel roll x 2 minutes  Supine serratus punch 2x10 3# dowel      CHADD participated in neuromuscular re-education activities to improve: Balance, Coordination, Proprioception and Posture for 5 minutes. The following activities were included:    Scap retraction x 30   Shoulder isometrics w/towel x " "5 5" hold ea-NP  Landmine press c 3# dowel 3x10-NP      CHADD received the following manual therapy techniques: Joint mobilizations and Soft tissue Mobilization were applied to the: B shoulder  for 10 minutes, including:    Gentle G/H distraction w/oscillations  STM to Pec Minor  G/H inferior joint mobs      CHADD received hot pack for 0 minutes to bilateral shoulders.      Home Exercises Provided and Patient Education Provided     Education provided:   - HEP review    Written Home Exercises Provided: Patient instructed to cont prior HEP.  Exercises were reviewed and CHADD was able to demonstrate them prior to the end of the session.  CHADD demonstrated good  understanding of the education provided.     See EMR under Patient Instructions for exercises provided prior visit.    Assessment   Treatment was altered today due to pt's late arrival time. A number of exercises were not performed due to time constraint. Pt continues to be limited by bilateral shoulder with continued gentle shoulder AAROM and periscapular activation exercises. Pt experienced increased shoulder pain with AAROM shoulder flex and only was able to perform one set of 10. Will monitor and attempt to progress per pt's tolerance.    CHADD Is progressing well towards her goals.   Pt prognosis is Fair.     Pt will continue to benefit from skilled outpatient physical therapy to address the deficits listed in the problem list box on initial evaluation, provide pt/family education and to maximize pt's level of independence in the home and community environment.     Pt's spiritual, cultural and educational needs considered and pt agreeable to plan of care and goals.     Anticipated barriers to physical therapy: pmhx    Goals:   Short Term Goals: 4 weeks   Pt to report worst pain 3/10 to improve QOL  Pt to improve B shoulder ROM by 10 degs  Pt to improve BUE strength by 1/2 grade  Pt to improve FOTO by 10%     Long Term Goals: 8 weeks   Pt to report " worst pain 0/10 to improve QOL  Pt to improve B shoulder ROM by 20 degs  Pt to improve BUE strength by 1 grade  Pt to improve FOTO by 20%    Plan     Plan of care Certification: 6/5/2024 to 7/31/2024.     Outpatient Physical Therapy 1-2 times weekly for 8 weeks to include the following interventions: Cervical/Lumbar Traction, Manual Therapy, Moist Heat/ Ice, Patient Education, Self Care, Therapeutic Activities, and Therapeutic Exercise.     Bob Sweet, PTA

## 2024-07-12 ENCOUNTER — CLINICAL SUPPORT (OUTPATIENT)
Dept: REHABILITATION | Facility: HOSPITAL | Age: 89
End: 2024-07-12
Payer: MEDICARE

## 2024-07-12 DIAGNOSIS — M25.612 IMPAIRED RANGE OF MOTION OF BOTH SHOULDERS: ICD-10-CM

## 2024-07-12 DIAGNOSIS — R29.898 UPPER EXTREMITY WEAKNESS: Primary | ICD-10-CM

## 2024-07-12 DIAGNOSIS — M25.611 IMPAIRED RANGE OF MOTION OF BOTH SHOULDERS: ICD-10-CM

## 2024-07-12 PROCEDURE — 97110 THERAPEUTIC EXERCISES: CPT | Mod: PO,CQ

## 2024-07-12 PROCEDURE — 97140 MANUAL THERAPY 1/> REGIONS: CPT | Mod: PO,CQ

## 2024-07-12 NOTE — PROGRESS NOTES
"Physical Therapy Daily Treatment Note     Name: Chadd Hurst [place]  Clinic Number: [id number]    Therapy Diagnosis:   Encounter Diagnoses   Name Primary?    Upper extremity weakness Yes    Impaired range of motion of both shoulders      Physician: Isai Ta MD    Visit Date: 7/12/2024    Physician Orders: PT Eval and Treat   Medical Diagnosis from Referral:   M25.50 (ICD-10-CM) - Polyarthralgia   M25.511,G89.29,M25.512 (ICD-10-CM) - Chronic pain of both shoulders      Evaluation Date: 6/5/2024  Authorization Period Expiration: 5/29/2024  Plan of Care Expiration: 7/31/2024  Progress Note Due: 7/5/2024  Date of Surgery: NA  Visit # / Visits authorized: 7/ 20   FOTO: 1/ 3    Time In: 1:30 pm   Time Out: 2:30 pm  Total Billable Time: 50 minutes     Precautions: Standard and Fall     Subjective     Pt reports: continued B shoulder pain levels    She was compliant with home exercise program.  Response to previous treatment: mod increase in muscular soreness  Functional change: TBD    Pain: 5/10  Location: bilateral shoulder      Objective     Objective measures displayed on progress notes unless otherwise noted      Treatment      CHADD received therapeutic exercises to develop strength, endurance, ROM, flexibility, posture and core stabilization for 25 minutes including:    Nu step level 1 x 6 minutes  Pulleys x 2 minutes ea (flex/abd)   Supine Shoulder flex AAROM w/dowel 2 x 10   Supine shoulder abd AAROM wdowel 2 x 10-NT  Supine shoulder ER AAROM w/dowel 2 x 10 ea  Supine pec stretch over towel roll x 2 minutes  Supine thoracic ext stretch over towel roll x 2 minutes  Supine serratus punch 2x10 3# dowel-NT      CHADD participated in neuromuscular re-education activities to improve: Balance, Coordination, Proprioception and Posture for 5 minutes. The following activities were included:    Scap retraction x 30   Shoulder isometrics w/towel x 5 5" hold ea-NP  Landmine press c 3# dowel 3x10-NP      CHADD received " the following manual therapy techniques: Joint mobilizations and Soft tissue Mobilization were applied to the: B shoulder  for 20 minutes, including:    Gentle G/H distraction w/oscillations  STM to Pec Minor/anterior deltoid  G/H inferior joint mobs      DENISA received hot pack for 0 minutes to bilateral shoulders.      Home Exercises Provided and Patient Education Provided     Education provided:   - HEP review    Written Home Exercises Provided: Patient instructed to cont prior HEP.  Exercises were reviewed and DENISA was able to demonstrate them prior to the end of the session.  DENISA demonstrated good  understanding of the education provided.     See EMR under Patient Instructions for exercises provided prior visit.    Assessment   Denisa returned reporting no change in bilateral shoulder pain levels. Treatment continued with emphasis on shoulder/periscapular mobility and strengthening. Pt experienced increased pain while attempting AAROM shoulder exercises and supine serratus wall slides. Increased time was spent on manual therapy techniques in effort to decrease pain levels and improve shoulder mobility. Pt was educated on the importance of HEP compliance to optimize results of Pt. Will monitor and attempt to progress per pt's tolerance.    DENISA Is progressing well towards her goals.   Pt prognosis is Fair.     Pt will continue to benefit from skilled outpatient physical therapy to address the deficits listed in the problem list box on initial evaluation, provide pt/family education and to maximize pt's level of independence in the home and community environment.     Pt's spiritual, cultural and educational needs considered and pt agreeable to plan of care and goals.     Anticipated barriers to physical therapy: pmhx    Goals:   Short Term Goals: 4 weeks   Pt to report worst pain 3/10 to improve QOL  Pt to improve B shoulder ROM by 10 degs  Pt to improve BUE strength by 1/2 grade  Pt to improve FOTO by  10%     Long Term Goals: 8 weeks   Pt to report worst pain 0/10 to improve QOL  Pt to improve B shoulder ROM by 20 degs  Pt to improve BUE strength by 1 grade  Pt to improve FOTO by 20%    Plan     Plan of care Certification: 6/5/2024 to 7/31/2024.     Outpatient Physical Therapy 1-2 times weekly for 8 weeks to include the following interventions: Cervical/Lumbar Traction, Manual Therapy, Moist Heat/ Ice, Patient Education, Self Care, Therapeutic Activities, and Therapeutic Exercise.     Bob Sweet, PTA

## 2024-07-16 ENCOUNTER — CLINICAL SUPPORT (OUTPATIENT)
Dept: REHABILITATION | Facility: HOSPITAL | Age: 89
End: 2024-07-16
Payer: MEDICARE

## 2024-07-16 DIAGNOSIS — M25.611 IMPAIRED RANGE OF MOTION OF BOTH SHOULDERS: ICD-10-CM

## 2024-07-16 DIAGNOSIS — M25.612 IMPAIRED RANGE OF MOTION OF BOTH SHOULDERS: ICD-10-CM

## 2024-07-16 DIAGNOSIS — R29.898 UPPER EXTREMITY WEAKNESS: Primary | ICD-10-CM

## 2024-07-16 PROCEDURE — 97110 THERAPEUTIC EXERCISES: CPT | Mod: KX,PO

## 2024-07-16 PROCEDURE — 97112 NEUROMUSCULAR REEDUCATION: CPT | Mod: KX,PO

## 2024-07-16 NOTE — PROGRESS NOTES
Physical Therapy Re-assessment     Name: Chadd Hurst [facility] Number: [id number]    Therapy Diagnosis:   Encounter Diagnoses   Name Primary?    Upper extremity weakness Yes    Impaired range of motion of both shoulders      Physician: Isai Ta MD    Visit Date: 7/16/2024    Physician Orders: PT Eval and Treat   Medical Diagnosis from Referral:   M25.50 (ICD-10-CM) - Polyarthralgia   M25.511,G89.29,M25.512 (ICD-10-CM) - Chronic pain of both shoulders      Evaluation Date: 6/5/2024  Authorization Period Expiration: 5/29/2024  Plan of Care Expiration: 7/31/2024  Progress Note Due: 7/31/2024  Date of Surgery: NA  Visit # / Visits authorized: 8/ 20   FOTO: 1/ 3    Time In: 2:00 pm   Time Out: 2:55 pm  Total Billable Time: 55 minutes     Precautions: Standard and Fall     Subjective     Pt reports: continued B shoulder pain levels but states her shoulders are feeling better today    She was compliant with home exercise program.  Response to previous treatment: mod increase in muscular soreness  Functional change: TBD    Pain: 3/10  Location: bilateral shoulder      Objective     Objective measures displayed on progress notes unless otherwise noted      Active Range of Motion:   Shoulder Left Right   Flexion 160 155   Abduction 170 165   ER reach T3 T2   IR reach T10 T8      Upper Extremity Strength    (L) UE (R) UE   Shoulder flexion: 4/5 4/5   Shoulder Abduction: 4/5 4/5   Shoulder ER 4/5 4/5   Shoulder IR 4+/5 4+/5        Treatment      CHADD received therapeutic exercises to develop strength, endurance, ROM, flexibility, posture and core stabilization for 40 minutes including:    Nu step level 1 x 6 minutes  Pulleys x 2 minutes ea (flex/abd)   Supine Shoulder flex AAROM w/dowel 2 x 10   Supine shoulder abd AAROM wdowel 2 x 10-NT  Supine shoulder ER AAROM w/dowel 2 x 10 ea  Supine pec stretch over towel roll x 2 minutes  Supine thoracic ext stretch over towel roll x 2 minutes  Supine serratus punch 2x10 3#  "dowel-NT      CHADD participated in neuromuscular re-education activities to improve: Balance, Coordination, Proprioception and Posture for 15 minutes. The following activities were included:    Scap retraction x 30   Shoulder isometrics w/towel x 5 5" hold ea-NP  Landmine press c 3# dowel 3x10-NP      CHADD received the following manual therapy techniques: Joint mobilizations and Soft tissue Mobilization were applied to the: B shoulder  for 0 minutes, including:    Gentle G/H distraction w/oscillations  STM to Pec Minor/anterior deltoid  G/H inferior joint mobs      CHADD received hot pack for 0 minutes to bilateral shoulders.      Home Exercises Provided and Patient Education Provided     Education provided:   - HEP review    Written Home Exercises Provided: Patient instructed to cont prior HEP.  Exercises were reviewed and CHADD was able to demonstrate them prior to the end of the session.  CHADD demonstrated good  understanding of the education provided.     See EMR under Patient Instructions for exercises provided prior visit.    Assessment   Re-assessment performed today. Pt with mild improvements in range of motion as a result of current POC. Despite this, she continues to present with decreased B shoulder range of motion and BUE strength that continues to require skilled PT intervention. Patient will continue to benefit from loading and progressions to tolerance going forward to improve overall function.    CHADD Is progressing well towards her goals.   Pt prognosis is Fair.     Pt will continue to benefit from skilled outpatient physical therapy to address the deficits listed in the problem list box on initial evaluation, provide pt/family education and to maximize pt's level of independence in the home and community environment.     Pt's spiritual, cultural and educational needs considered and pt agreeable to plan of care and goals.     Anticipated barriers to physical therapy: pmhx    Goals:   Short " Term Goals: 4 weeks   Pt to report worst pain 3/10 to improve QOL-MET 7/16  Pt to improve B shoulder ROM by 10 degs  Pt to improve BUE strength by 1/2 grade  Pt to improve FOTO by 10%     Long Term Goals: 8 weeks   Pt to report worst pain 0/10 to improve QOL  Pt to improve B shoulder ROM by 20 degs  Pt to improve BUE strength by 1 grade  Pt to improve FOTO by 20%    Plan     Plan of care Certification: 6/5/2024 to 7/31/2024.     Outpatient Physical Therapy 1-2 times weekly for 8 weeks to include the following interventions: Cervical/Lumbar Traction, Manual Therapy, Moist Heat/ Ice, Patient Education, Self Care, Therapeutic Activities, and Therapeutic Exercise.     William Solorzano, PT

## 2024-07-22 DIAGNOSIS — M05.9 SEROPOSITIVE RHEUMATOID ARTHRITIS: ICD-10-CM

## 2024-07-22 DIAGNOSIS — M25.50 POLYARTHRALGIA: ICD-10-CM

## 2024-07-22 RX ORDER — CELECOXIB 100 MG/1
CAPSULE ORAL
Qty: 30 CAPSULE | Refills: 5 | Status: SHIPPED | OUTPATIENT
Start: 2024-07-22

## 2024-07-23 ENCOUNTER — CLINICAL SUPPORT (OUTPATIENT)
Dept: REHABILITATION | Facility: HOSPITAL | Age: 89
End: 2024-07-23
Payer: MEDICARE

## 2024-07-23 DIAGNOSIS — M25.611 IMPAIRED RANGE OF MOTION OF BOTH SHOULDERS: ICD-10-CM

## 2024-07-23 DIAGNOSIS — M25.612 IMPAIRED RANGE OF MOTION OF BOTH SHOULDERS: ICD-10-CM

## 2024-07-23 DIAGNOSIS — R29.898 UPPER EXTREMITY WEAKNESS: Primary | ICD-10-CM

## 2024-07-23 PROCEDURE — 97110 THERAPEUTIC EXERCISES: CPT | Mod: PO,CQ

## 2024-07-23 PROCEDURE — 97140 MANUAL THERAPY 1/> REGIONS: CPT | Mod: PO,CQ

## 2024-07-23 NOTE — PROGRESS NOTES
"Physical Therapy Re-assessment     Name: Chadd Hurst [facility] Number: [id number]    Therapy Diagnosis:   Encounter Diagnoses   Name Primary?    Upper extremity weakness Yes    Impaired range of motion of both shoulders      Physician: Isai Ta MD    Visit Date: 7/23/2024    Physician Orders: PT Eval and Treat   Medical Diagnosis from Referral:   M25.50 (ICD-10-CM) - Polyarthralgia   M25.511,G89.29,M25.512 (ICD-10-CM) - Chronic pain of both shoulders      Evaluation Date: 6/5/2024  Authorization Period Expiration: 5/29/2024  Plan of Care Expiration: 7/31/2024  Progress Note Due: 7/31/2024  Date of Surgery: NA  Visit # / Visits authorized: 9/ 20   FOTO: 1/ 3    Time In: 3:00 pm   Time Out: 3:55 pm  Total Billable Time: 25 minutes 1:1    Precautions: Standard and Fall     Subjective     Pt reports: "I have been feeling better recently; I really like coming here".    She was compliant with home exercise program.  Response to previous treatment: mod increase in muscular soreness  Functional change: TBD    Pain: 3/10  Location: bilateral shoulder      Objective     Objective measures displayed on progress notes unless otherwise noted      Active Range of Motion:   Shoulder Left Right   Flexion 160 155   Abduction 170 165   ER reach T3 T2   IR reach T10 T8      Upper Extremity Strength    (L) UE (R) UE   Shoulder flexion: 4/5 4/5   Shoulder Abduction: 4/5 4/5   Shoulder ER 4/5 4/5   Shoulder IR 4+/5 4+/5        Treatment      CHADD received therapeutic exercises to develop strength, endurance, ROM, flexibility, posture and core stabilization for 27 minutes including:    Nu step level 1 x 6 minutes  Pulleys x 2 minutes ea (flex/abd)   Supine Shoulder flex AAROM w/dowel 2 x 10   Supine shoulder abd AAROM wdowel 2 x 10  Supine shoulder ER AAROM w/dowel 2 x 10 ea  Supine pec stretch over towel roll x 2 minutes  Supine thoracic ext stretch over towel roll x 2 minutes  Supine serratus punch 2x10 3# " "dowel-NT      DENISA participated in neuromuscular re-education activities to improve: Balance, Coordination, Proprioception and Posture for 15 minutes. The following activities were included:    Scap retraction x 30   Shoulder isometrics w/towel x 5 5" hold ea-NP  Landmine press c 3# dowel 3x10-NP      DENISA received the following manual therapy techniques: Joint mobilizations and Soft tissue Mobilization were applied to the: B shoulder  for 8 minutes, including:    Gentle G/H distraction w/oscillations  STM to Pec Minor/anterior deltoid  G/H inferior joint mobs-NP      DENISA received hot pack for 0 minutes to bilateral shoulders.      Home Exercises Provided and Patient Education Provided     Education provided:   - HEP review    Written Home Exercises Provided: Patient instructed to cont prior HEP.  Exercises were reviewed and DENISA was able to demonstrate them prior to the end of the session.  DEINSA demonstrated good  understanding of the education provided.     See EMR under Patient Instructions for exercises provided prior visit.    Assessment   Denisa tolerated continuation of bilateral shoulder mobility and periscapular/postural strengthening well, as she was able to perform exercises without adverse effects today that caused increased shoulder pain during previous visits. STM to bilateral anterior deltoid/pec minor performed with pt noting decreased pain levels post manual technique. Will continue to progress per pt's tolerance to improve functional shoulder mobility and strength.    DENISA Is progressing well towards her goals.   Pt prognosis is Fair.     Pt will continue to benefit from skilled outpatient physical therapy to address the deficits listed in the problem list box on initial evaluation, provide pt/family education and to maximize pt's level of independence in the home and community environment.     Pt's spiritual, cultural and educational needs considered and pt agreeable to plan of care " and goals.     Anticipated barriers to physical therapy: pmhx    Goals:   Short Term Goals: 4 weeks   Pt to report worst pain 3/10 to improve QOL-MET 7/16  Pt to improve B shoulder ROM by 10 degs  Pt to improve BUE strength by 1/2 grade  Pt to improve FOTO by 10%     Long Term Goals: 8 weeks   Pt to report worst pain 0/10 to improve QOL  Pt to improve B shoulder ROM by 20 degs  Pt to improve BUE strength by 1 grade  Pt to improve FOTO by 20%    Plan     Plan of care Certification: 6/5/2024 to 7/31/2024.     Outpatient Physical Therapy 1-2 times weekly for 8 weeks to include the following interventions: Cervical/Lumbar Traction, Manual Therapy, Moist Heat/ Ice, Patient Education, Self Care, Therapeutic Activities, and Therapeutic Exercise.     Bob Sweet, PTA

## 2024-07-26 ENCOUNTER — CLINICAL SUPPORT (OUTPATIENT)
Dept: REHABILITATION | Facility: HOSPITAL | Age: 89
End: 2024-07-26
Payer: MEDICARE

## 2024-07-26 DIAGNOSIS — R29.898 UPPER EXTREMITY WEAKNESS: Primary | ICD-10-CM

## 2024-07-26 DIAGNOSIS — M25.612 IMPAIRED RANGE OF MOTION OF BOTH SHOULDERS: ICD-10-CM

## 2024-07-26 DIAGNOSIS — M25.611 IMPAIRED RANGE OF MOTION OF BOTH SHOULDERS: ICD-10-CM

## 2024-07-26 PROCEDURE — 97110 THERAPEUTIC EXERCISES: CPT | Mod: PO,CQ

## 2024-07-26 PROCEDURE — 97112 NEUROMUSCULAR REEDUCATION: CPT | Mod: PO,CQ

## 2024-07-26 PROCEDURE — 97140 MANUAL THERAPY 1/> REGIONS: CPT | Mod: PO,CQ

## 2024-07-26 NOTE — PROGRESS NOTES
"Physical Therapy Re-assessment     Name: Chadd Hurst [facility] Number: [id number]    Therapy Diagnosis:   Encounter Diagnoses   Name Primary?    Upper extremity weakness Yes    Impaired range of motion of both shoulders      Physician: Isai Ta MD    Visit Date: 7/26/2024    Physician Orders: PT Eval and Treat   Medical Diagnosis from Referral:   M25.50 (ICD-10-CM) - Polyarthralgia   M25.511,G89.29,M25.512 (ICD-10-CM) - Chronic pain of both shoulders      Evaluation Date: 6/5/2024  Authorization Period Expiration: 5/29/2024  Plan of Care Expiration: 7/31/2024  Progress Note Due: 7/31/2024  Date of Surgery: NA  Visit # / Visits authorized: 10/ 20   FOTO: 1/ 3    Time In: 2:40 pm   Time Out: 3:35 pm  Total Billable Time: 50 minutes     Precautions: Standard and Fall     Subjective     Pt reports: "I have been feeling better recently; I really like coming here".    She was compliant with home exercise program.  Response to previous treatment: mod increase in muscular soreness  Functional change: TBD    Pain: 3/10  Location: bilateral shoulder      Objective     Objective measures displayed on progress notes unless otherwise noted      Active Range of Motion:   Shoulder Left Right   Flexion 160 155   Abduction 170 165   ER reach T3 T2   IR reach T10 T8      Upper Extremity Strength    (L) UE (R) UE   Shoulder flexion: 4/5 4/5   Shoulder Abduction: 4/5 4/5   Shoulder ER 4/5 4/5   Shoulder IR 4+/5 4+/5        Treatment      CHADD received therapeutic exercises to develop strength, endurance, ROM, flexibility, posture and core stabilization for 30 minutes including:    Nu step level 1 x 6 minutes  Pulleys x 2 minutes ea (flex/abd)   Supine Shoulder flex AAROM w/dowel 2 x 10   Supine shoulder abd AAROM wdowel 2 x 10  Supine shoulder ER AAROM w/dowel 2 x 10 ea  Supine pec stretch over towel roll x 2 minutes  Supine thoracic ext stretch over towel roll x 2 minutes  Supine serratus punch 2x10 3# dowel-NT      CHADD " "participated in neuromuscular re-education activities to improve: Balance, Coordination, Proprioception and Posture for 10 minutes. The following activities were included:    Scap retraction x 30   Shoulder isometrics w/towel x 5 5" hold ea  Landmine press c 3# dowel 3x10-NP      DENISA received the following manual therapy techniques: Joint mobilizations and Soft tissue Mobilization were applied to the: B shoulder  for 10 minutes, including:    Gentle G/H distraction w/oscillations  STM to Pec Minor/anterior deltoid  G/H inferior joint mobs-NP      DENISA received cold pack for 5 minutes to bilateral shoulders.      Home Exercises Provided and Patient Education Provided     Education provided:   - HEP review    Written Home Exercises Provided: Patient instructed to cont prior HEP.  Exercises were reviewed and DENISA was able to demonstrate them prior to the end of the session.  DENISA demonstrated good  understanding of the education provided.     See EMR under Patient Instructions for exercises provided prior visit.    Assessment   Denisa tolerated continuation of bilateral shoulder mobility and periscapular/postural strengthening fair, as she continues to experience increases in anterior shoulder pain with AAROM exercises. STM to bilateral anterior deltoid/pec minor performed without change in pain levels post manual technique. Shoulder isometrics were reincorporated into treatment in effort to activate/strengthen shoulder musculature within pain free range. Will continue to progress per pt's tolerance to improve functional shoulder mobility and strength.    DENISA Is progressing well towards her goals.   Pt prognosis is Fair.     Pt will continue to benefit from skilled outpatient physical therapy to address the deficits listed in the problem list box on initial evaluation, provide pt/family education and to maximize pt's level of independence in the home and community environment.     Pt's spiritual, cultural " and educational needs considered and pt agreeable to plan of care and goals.     Anticipated barriers to physical therapy: pmhx    Goals:   Short Term Goals: 4 weeks   Pt to report worst pain 3/10 to improve QOL-MET 7/16  Pt to improve B shoulder ROM by 10 degs  Pt to improve BUE strength by 1/2 grade  Pt to improve FOTO by 10%     Long Term Goals: 8 weeks   Pt to report worst pain 0/10 to improve QOL  Pt to improve B shoulder ROM by 20 degs  Pt to improve BUE strength by 1 grade  Pt to improve FOTO by 20%    Plan     Plan of care Certification: 6/5/2024 to 7/31/2024.     Outpatient Physical Therapy 1-2 times weekly for 8 weeks to include the following interventions: Cervical/Lumbar Traction, Manual Therapy, Moist Heat/ Ice, Patient Education, Self Care, Therapeutic Activities, and Therapeutic Exercise.     Bob Sweet, PTA

## 2024-08-02 ENCOUNTER — CLINICAL SUPPORT (OUTPATIENT)
Dept: REHABILITATION | Facility: HOSPITAL | Age: 89
End: 2024-08-02
Payer: MEDICARE

## 2024-08-02 DIAGNOSIS — M25.612 IMPAIRED RANGE OF MOTION OF BOTH SHOULDERS: ICD-10-CM

## 2024-08-02 DIAGNOSIS — R29.898 UPPER EXTREMITY WEAKNESS: Primary | ICD-10-CM

## 2024-08-02 DIAGNOSIS — M25.611 IMPAIRED RANGE OF MOTION OF BOTH SHOULDERS: ICD-10-CM

## 2024-08-02 PROCEDURE — 97110 THERAPEUTIC EXERCISES: CPT | Mod: PO,CQ

## 2024-08-02 PROCEDURE — 97140 MANUAL THERAPY 1/> REGIONS: CPT | Mod: PO,CQ

## 2024-08-02 NOTE — PROGRESS NOTES
"Physical Therapy Note      Name: Chadd Hurst [facility] Number: [id number]    Therapy Diagnosis:   Encounter Diagnoses   Name Primary?    Upper extremity weakness Yes    Impaired range of motion of both shoulders      Physician: Isai Ta MD    Visit Date: 8/2/2024    Physician Orders: PT Eval and Treat   Medical Diagnosis from Referral:   M25.50 (ICD-10-CM) - Polyarthralgia   M25.511,G89.29,M25.512 (ICD-10-CM) - Chronic pain of both shoulders      Evaluation Date: 6/5/2024  Authorization Period Expiration: 5/29/2024  Plan of Care Expiration: 7/31/2024  Progress Note Due: 7/31/2024  Date of Surgery: NA  Visit # / Visits authorized: 11/ 20   FOTO: 1/ 3    Time In: 12:30 pm   Time Out: 1:28 pm  Total Billable Time: 53 minutes     Precautions: Standard and Fall     Subjective     Pt reports: increased bilateral shoulder pain levels currently and states "the shoulders are killing me today". She also notes experiencing lower back pain upon arrival for treatment.    She was compliant with home exercise program.  Response to previous treatment: mod increase in muscular soreness  Functional change: TBD    Pain: 8/10  Location: bilateral shoulder      Objective     Objective measures displayed on progress notes unless otherwise noted      Active Range of Motion:   Shoulder Left Right   Flexion 160 155   Abduction 170 165   ER reach T3 T2   IR reach T10 T8      Upper Extremity Strength    (L) UE (R) UE   Shoulder flexion: 4/5 4/5   Shoulder Abduction: 4/5 4/5   Shoulder ER 4/5 4/5   Shoulder IR 4+/5 4+/5        Treatment      CHADD received therapeutic exercises to develop strength, endurance, ROM, flexibility, posture and core stabilization for 23 minutes including:    Nu step level 1 x 8 minutes  Pulleys x 2 minutes ea (flex/abd)   PROM Flex/ABD  Supine Shoulder flex AAROM w/dowel 2 x 10   Supine shoulder abd AAROM wdowel 2 x 10  Supine shoulder ER AAROM w/dowel 2 x 10 ea-NP  Supine pec stretch over towel roll x 2 " "minutes  Supine thoracic ext stretch over towel roll x 2 minutes  Supine serratus punch 2x10 3# dowel-NT      DENISA participated in neuromuscular re-education activities to improve: Balance, Coordination, Proprioception and Posture for 0 minutes. The following activities were included:    Scap retraction x 30   Shoulder isometrics w/towel x 5 5" hold ea  Landmine press c 3# dowel 3x10-NP      DENISA received the following manual therapy techniques: Joint mobilizations and Soft tissue Mobilization were applied to the: B shoulder  for 30 minutes, including:    Gentle G/H distraction w/oscillations  STM to Pec Minor/anterior deltoid  G/H inferior joint mobs-NP      DENISA received moist hot pack for 5 minutes to bilateral shoulders.      Home Exercises Provided and Patient Education Provided     Education provided:   - HEP review    Written Home Exercises Provided: Patient instructed to cont prior HEP.  Exercises were reviewed and DENISA was able to demonstrate them prior to the end of the session.  DENISA demonstrated good  understanding of the education provided.     See EMR under Patient Instructions for exercises provided prior visit.    Assessment   Denisa returned reporting elevated bilateral shoulder pain levels. Conservative approach taken for today's treatment with increased time spent on manual therapy techniques, PROM to B shoulders, and moist hot pack. Pt noted good relief and decreased overall pain levels post treatment. Will attempt to progress per pt's tolerance to improve functional shoulder mobility and strength.    DENISA Is progressing well towards her goals.   Pt prognosis is Fair.     Pt will continue to benefit from skilled outpatient physical therapy to address the deficits listed in the problem list box on initial evaluation, provide pt/family education and to maximize pt's level of independence in the home and community environment.     Pt's spiritual, cultural and educational needs considered " and pt agreeable to plan of care and goals.     Anticipated barriers to physical therapy: pmhx    Goals:   Short Term Goals: 4 weeks   Pt to report worst pain 3/10 to improve QOL-MET 7/16  Pt to improve B shoulder ROM by 10 degs  Pt to improve BUE strength by 1/2 grade  Pt to improve FOTO by 10%     Long Term Goals: 8 weeks   Pt to report worst pain 0/10 to improve QOL  Pt to improve B shoulder ROM by 20 degs  Pt to improve BUE strength by 1 grade  Pt to improve FOTO by 20%    Plan     Plan of care Certification: 6/5/2024 to 7/31/2024.     Outpatient Physical Therapy 1-2 times weekly for 8 weeks to include the following interventions: Cervical/Lumbar Traction, Manual Therapy, Moist Heat/ Ice, Patient Education, Self Care, Therapeutic Activities, and Therapeutic Exercise.     Bob Sweet, PTA

## 2024-08-06 ENCOUNTER — CLINICAL SUPPORT (OUTPATIENT)
Dept: REHABILITATION | Facility: HOSPITAL | Age: 89
End: 2024-08-06
Payer: MEDICARE

## 2024-08-06 DIAGNOSIS — M25.612 IMPAIRED RANGE OF MOTION OF BOTH SHOULDERS: ICD-10-CM

## 2024-08-06 DIAGNOSIS — M25.611 IMPAIRED RANGE OF MOTION OF BOTH SHOULDERS: ICD-10-CM

## 2024-08-06 DIAGNOSIS — R29.898 UPPER EXTREMITY WEAKNESS: Primary | ICD-10-CM

## 2024-08-06 PROCEDURE — 97110 THERAPEUTIC EXERCISES: CPT | Mod: PO,CQ

## 2024-08-06 PROCEDURE — 97140 MANUAL THERAPY 1/> REGIONS: CPT | Mod: PO,CQ

## 2024-08-06 PROCEDURE — 97530 THERAPEUTIC ACTIVITIES: CPT | Mod: PO,CQ

## 2024-08-14 ENCOUNTER — CLINICAL SUPPORT (OUTPATIENT)
Dept: REHABILITATION | Facility: HOSPITAL | Age: 89
End: 2024-08-14
Payer: MEDICARE

## 2024-08-14 DIAGNOSIS — M25.612 IMPAIRED RANGE OF MOTION OF BOTH SHOULDERS: ICD-10-CM

## 2024-08-14 DIAGNOSIS — M25.611 IMPAIRED RANGE OF MOTION OF BOTH SHOULDERS: ICD-10-CM

## 2024-08-14 DIAGNOSIS — R29.898 UPPER EXTREMITY WEAKNESS: Primary | ICD-10-CM

## 2024-08-14 PROCEDURE — 97112 NEUROMUSCULAR REEDUCATION: CPT | Mod: KX,PO

## 2024-08-14 PROCEDURE — 97110 THERAPEUTIC EXERCISES: CPT | Mod: PO

## 2024-08-14 PROCEDURE — 97530 THERAPEUTIC ACTIVITIES: CPT | Mod: PO

## 2024-08-14 NOTE — PROGRESS NOTES
Physical Therapy Discharge Note      Name: Chadd Hurst [facility] Number: [id number]    Therapy Diagnosis:   Encounter Diagnoses   Name Primary?    Upper extremity weakness Yes    Impaired range of motion of both shoulders      Physician: Isai Ta MD    Visit Date: 8/14/2024    Physician Orders: PT Eval and Treat   Medical Diagnosis from Referral:   M25.50 (ICD-10-CM) - Polyarthralgia   M25.511,G89.29,M25.512 (ICD-10-CM) - Chronic pain of both shoulders      Evaluation Date: 6/5/2024  Authorization Period Expiration: 5/29/2024  Plan of Care Expiration: 7/31/2024  Progress Note Due: 7/31/2024  Date of Surgery: NA  Visit # / Visits authorized: 13/ 20   FOTO: 1/ 3    Time In: 3:59 pm   Time Out: 4:52 pm  Total Billable Time: 53 minutes     Precautions: Standard and Fall     Subjective     Pt reports: no significant changes and is open to making today her final day    She was compliant with home exercise program.  Response to previous treatment: mod increase in muscular soreness  Functional change: TBD    Pain: 8/10  Location: bilateral shoulder      Objective     Objective measures displayed on progress notes unless otherwise noted      Active Range of Motion:   Shoulder Left Right   Flexion 160 155   Abduction 170 165   ER reach T3 T3   IR reach T10 T9      Upper Extremity Strength    (L) UE (R) UE   Shoulder flexion: 4+/5 4+/5   Shoulder Abduction: 4/5 4/5   Shoulder ER 4/5 4/5   Shoulder IR 4+/5 4+/5        Treatment      CHADD received therapeutic exercises to develop strength, endurance, ROM, flexibility, posture and core stabilization for 23 minutes including:    Nu step level 1 x 8 minutes  Pulleys x 2 minutes ea (flex/abd)   PROM Flex/ABD  Supine Shoulder flex AAROM w/dowel 2 x 10 -NP  Supine shoulder abd AAROM wdowel 2 x 10-NP  Supine shoulder ER AAROM w/dowel 2 x 10 ea-NP  Supine pec stretch over towel roll x 2 minutes  Supine thoracic ext stretch over towel roll x 2 minutes  Supine serratus punch  "2x10 3# dowel-NT    CHADD participated in dynamic functional therapeutic activities to improve functional performance for 15  minutes, including:  Instruction, assist, and education on results of FOTO questionnaire  Pt received moist hot pack to bilateral shoulders while performing FOTO questionnaire  Re-assessment      CHADD participated in neuromuscular re-education activities to improve: Balance, Coordination, Proprioception and Posture for 15 minutes. The following activities were included:    Scap retraction x 30   Shoulder isometrics w/towel x 5 5" hold ea  Landmine press c 3# dowel 3x10-NP      CHADD received the following manual therapy techniques: Joint mobilizations and Soft tissue Mobilization were applied to the: B shoulder for 0 minutes, including:    Gentle G/H distraction w/oscillations  STM to Pec Minor/anterior deltoid  G/H inferior joint mobs-NP          Home Exercises Provided and Patient Education Provided     Education provided:   - HEP review    Written Home Exercises Provided: Patient instructed to cont prior HEP.  Exercises were reviewed and CHADD was able to demonstrate them prior to the end of the session.  CHADD demonstrated good  understanding of the education provided.     See EMR under Patient Instructions for exercises provided prior visit.    Assessment   Re-assessment performed today. Pt noted to have met a progress plateau at this time but with good overall independence with HEP at home. Due to this, pt was educated on discharge today and verbalized agreement and good understanding. At this time, pt will benefit from discharge home with maintenance of progress via HEP on her own.     CHADD Is progressing well towards her goals.   Pt prognosis is Fair.     Pt will discharge.     Pt's spiritual, cultural and educational needs considered and pt agreeable to plan of care and goals.     Anticipated barriers to physical therapy: pmhx    Goals: Nearly Met  Short Term Goals: 4 weeks "   Pt to report worst pain 3/10 to improve QOL-MET 7/16  Pt to improve B shoulder ROM by 10 degs  Pt to improve BUE strength by 1/2 grade  Pt to improve FOTO by 10%     Long Term Goals: 8 weeks   Pt to report worst pain 0/10 to improve QOL  Pt to improve B shoulder ROM by 20 degs  Pt to improve BUE strength by 1 grade  Pt to improve FOTO by 20%    Plan     Discharge today    William Solorzano, PT

## 2024-08-23 ENCOUNTER — PATIENT MESSAGE (OUTPATIENT)
Dept: RHEUMATOLOGY | Facility: CLINIC | Age: 89
End: 2024-08-23
Payer: MEDICARE

## 2024-08-27 DIAGNOSIS — Z51.81 MEDICATION MONITORING ENCOUNTER: Primary | ICD-10-CM

## 2024-09-06 ENCOUNTER — TELEPHONE (OUTPATIENT)
Dept: RHEUMATOLOGY | Facility: CLINIC | Age: 89
End: 2024-09-06
Payer: MEDICARE

## 2024-09-06 NOTE — TELEPHONE ENCOUNTER
Eva Ellis, PharmD        Please r/o to patient's daughter Jose to schedule follow-up CBC/CMP this month. Patient prefers [place] [facility].   Attempt to schedule -- daughter wants a call back on Monday to schedule.

## 2024-09-20 DIAGNOSIS — M05.9 SEROPOSITIVE RHEUMATOID ARTHRITIS: ICD-10-CM

## 2024-09-20 RX ORDER — METHOTREXATE 2.5 MG/1
15 TABLET ORAL
Qty: 24 TABLET | Refills: 3 | Status: SHIPPED | OUTPATIENT
Start: 2024-09-20

## 2024-10-07 ENCOUNTER — PATIENT MESSAGE (OUTPATIENT)
Dept: RESEARCH | Facility: HOSPITAL | Age: 89
End: 2024-10-07
Payer: MEDICARE

## 2024-11-29 ENCOUNTER — TELEPHONE (OUTPATIENT)
Dept: RHEUMATOLOGY | Facility: CLINIC | Age: 89
End: 2024-11-29
Payer: MEDICARE

## 2024-11-29 ENCOUNTER — PATIENT MESSAGE (OUTPATIENT)
Dept: RHEUMATOLOGY | Facility: CLINIC | Age: 89
End: 2024-11-29
Payer: MEDICARE

## 2024-12-17 DIAGNOSIS — M05.9 SEROPOSITIVE RHEUMATOID ARTHRITIS: ICD-10-CM

## 2024-12-17 DIAGNOSIS — D84.821 IMMUNOSUPPRESSION DUE TO DRUG THERAPY: Primary | ICD-10-CM

## 2024-12-17 DIAGNOSIS — Z79.899 IMMUNOSUPPRESSION DUE TO DRUG THERAPY: Primary | ICD-10-CM

## 2024-12-17 RX ORDER — METHOTREXATE 2.5 MG/1
15 TABLET ORAL
Qty: 24 TABLET | Refills: 3 | Status: SHIPPED | OUTPATIENT
Start: 2024-12-17

## 2024-12-19 ENCOUNTER — LAB VISIT (OUTPATIENT)
Dept: LAB | Facility: HOSPITAL | Age: 89
End: 2024-12-19
Attending: INTERNAL MEDICINE
Payer: MEDICARE

## 2024-12-19 DIAGNOSIS — M81.0 OSTEOPOROSIS, POST-MENOPAUSAL: ICD-10-CM

## 2024-12-19 LAB
ALBUMIN SERPL BCP-MCNC: 3.4 G/DL (ref 3.5–5.2)
ALP SERPL-CCNC: 49 U/L (ref 40–150)
ALT SERPL W/O P-5'-P-CCNC: 10 U/L (ref 10–44)
ANION GAP SERPL CALC-SCNC: 7 MMOL/L (ref 8–16)
AST SERPL-CCNC: 11 U/L (ref 10–40)
BILIRUB SERPL-MCNC: 0.3 MG/DL (ref 0.1–1)
BUN SERPL-MCNC: 20 MG/DL (ref 8–23)
CALCIUM SERPL-MCNC: 9 MG/DL (ref 8.7–10.5)
CHLORIDE SERPL-SCNC: 106 MMOL/L (ref 95–110)
CO2 SERPL-SCNC: 25 MMOL/L (ref 23–29)
CREAT SERPL-MCNC: 0.7 MG/DL (ref 0.5–1.4)
EST. GFR  (NO RACE VARIABLE): >60 ML/MIN/1.73 M^2
GLUCOSE SERPL-MCNC: 96 MG/DL (ref 70–110)
POTASSIUM SERPL-SCNC: 4.6 MMOL/L (ref 3.5–5.1)
PROT SERPL-MCNC: 6.1 G/DL (ref 6–8.4)
SODIUM SERPL-SCNC: 138 MMOL/L (ref 136–145)

## 2024-12-19 PROCEDURE — 80053 COMPREHEN METABOLIC PANEL: CPT | Performed by: STUDENT IN AN ORGANIZED HEALTH CARE EDUCATION/TRAINING PROGRAM

## 2024-12-19 PROCEDURE — 36415 COLL VENOUS BLD VENIPUNCTURE: CPT | Performed by: STUDENT IN AN ORGANIZED HEALTH CARE EDUCATION/TRAINING PROGRAM

## 2024-12-20 ENCOUNTER — CLINICAL SUPPORT (OUTPATIENT)
Dept: RHEUMATOLOGY | Facility: CLINIC | Age: 89
End: 2024-12-20
Payer: MEDICARE

## 2024-12-20 DIAGNOSIS — D84.821 IMMUNOSUPPRESSION DUE TO DRUG THERAPY: Primary | ICD-10-CM

## 2024-12-20 DIAGNOSIS — Z79.899 IMMUNOSUPPRESSION DUE TO DRUG THERAPY: Primary | ICD-10-CM

## 2024-12-20 DIAGNOSIS — M81.0 OSTEOPOROSIS, POST-MENOPAUSAL: ICD-10-CM

## 2024-12-20 NOTE — PROGRESS NOTES
"  Prolia 60 mg q 6 months  Last dose given- 6/24/24   Last DXA- 5/29/2024. Due 5/29/26      Any invasive dental procedures in past 3 months or upcoming 3 months: no      Last Rheumatology provider visit- 5/29/24      Calcium:   Lab Results   Component Value Date    CALCIUM 9.0 12/19/2024     Vitamin D:   Vit D, 25-Hydroxy (ng/mL)   Date Value   05/29/2024 34     SCr/CrCl: 0.7 on 12/19/24      NDC: 10817-168-08  Lot #- [id number]             Expiration Date- 4/30/2027     Flu vaccine high dose also administered to patient in right deltoid.       Prolia 60 mg/ml administered SQ to left deltoid. Tolerated without any complaints. No redness, swelling, or drainage noted to site. Instructed to remain in clinic 15 minutes after administration to monitor for any sign/symptom of reaction. Patient instructed on signs and symptoms of reaction to report. Verbalizes understanding.      Pt reports "hurts everywhere," but especially deep     "

## 2024-12-20 NOTE — PATIENT INSTRUCTIONS
Topical voltaren gel- apply to lower back 3x daily. Note that it may take 7 days to notice improvement.   Follow up with Dr. Rahman for next office visit.   We discussed possible imaging/referral to orthopedics for lower back pain evaluation.   Please reach out to our office with any questions or concerns.     Eva Ellis, PharmD  Ambulatory Clinical Pharmacist- Rheumatology

## 2024-12-29 ENCOUNTER — OFFICE VISIT (OUTPATIENT)
Dept: URGENT CARE | Facility: CLINIC | Age: 89
End: 2024-12-29
Payer: MEDICARE

## 2024-12-29 VITALS
SYSTOLIC BLOOD PRESSURE: 118 MMHG | DIASTOLIC BLOOD PRESSURE: 66 MMHG | BODY MASS INDEX: 26.31 KG/M2 | RESPIRATION RATE: 16 BRPM | OXYGEN SATURATION: 97 % | TEMPERATURE: 98 F | HEIGHT: 62 IN | HEART RATE: 64 BPM | WEIGHT: 143 LBS

## 2024-12-29 DIAGNOSIS — R39.9 UTI SYMPTOMS: ICD-10-CM

## 2024-12-29 DIAGNOSIS — N39.0 URINARY TRACT INFECTION WITHOUT HEMATURIA, SITE UNSPECIFIED: Primary | ICD-10-CM

## 2024-12-29 LAB
BILIRUBIN, UA POC OHS: ABNORMAL
BLOOD, UA POC OHS: NEGATIVE
CLARITY, UA POC OHS: CLEAR
COLOR, UA POC OHS: YELLOW
GLUCOSE, UA POC OHS: NEGATIVE
KETONES, UA POC OHS: ABNORMAL
LEUKOCYTES, UA POC OHS: ABNORMAL
NITRITE, UA POC OHS: POSITIVE
PH, UA POC OHS: 5
PROTEIN, UA POC OHS: 30
SPECIFIC GRAVITY, UA POC OHS: 1.02
UROBILINOGEN, UA POC OHS: 0.2

## 2024-12-29 PROCEDURE — 87086 URINE CULTURE/COLONY COUNT: CPT | Performed by: PHYSICIAN ASSISTANT

## 2024-12-29 RX ORDER — CEPHALEXIN 500 MG/1
500 CAPSULE ORAL EVERY 6 HOURS
Qty: 28 CAPSULE | Refills: 0 | Status: SHIPPED | OUTPATIENT
Start: 2024-12-29 | End: 2025-01-05

## 2024-12-29 NOTE — PROGRESS NOTES
"Subjective:      Patient ID: Denisa Craft is a 89 y.o. female.    Vitals:  height is 5' 2" (1.575 m) and weight is 64.9 kg (143 lb). Her oral temperature is 97.9 °F (36.6 °C). Her blood pressure is 118/66 and her pulse is 64. Her respiration is 16 and oxygen saturation is 97%.     Chief Complaint: UTI Symptoms    Patient is with daughter on exam. Patient presents to urgent care with possible UTI. Daughter/patient would like us to call other daughter (Analilia) in chart with Urine Culture results.     Urinary Tract Infection   This is a new problem. The current episode started in the past 7 days. The problem occurs every urination. The problem has been unchanged. The quality of the pain is described as aching and burning. The pain is at a severity of 3/10. The pain is mild. There has been no fever. She is Not sexually active. There is No history of pyelonephritis. Associated symptoms include frequency and urgency. Pertinent negatives include no behavior changes, chills, discharge, flank pain, hematuria, hesitancy, nausea, possible pregnancy, sweats, vomiting, weight loss, bubble bath use, constipation, rash or withholding. She has tried nothing for the symptoms.     Constitution: Positive for fatigue. Negative for chills, sweating and fever.   HENT:  Negative for ear pain, drooling, congestion, sore throat, trouble swallowing and voice change.    Neck: Negative for neck pain, neck stiffness, painful lymph nodes and neck swelling.   Cardiovascular:  Negative for chest pain, leg swelling, palpitations, sob on exertion and passing out.   Eyes:  Negative for eye pain, eye redness, photophobia, double vision, blurred vision and eyelid swelling.   Respiratory:  Negative for chest tightness, cough, sputum production, bloody sputum, shortness of breath, stridor and wheezing.    Gastrointestinal:  Negative for abdominal pain, abdominal bloating, nausea, vomiting, constipation, diarrhea and heartburn.   Genitourinary:  " Positive for dysuria, frequency and urgency. Negative for flank pain, hematuria, vaginal pain, vaginal discharge, vaginal bleeding, vaginal odor, genital sore and pelvic pain.   Musculoskeletal:  Negative for joint pain, joint swelling, abnormal ROM of joint, back pain, muscle cramps and muscle ache.   Skin:  Negative for rash and hives.   Allergic/Immunologic: Negative for seasonal allergies, food allergies, hives, itching and sneezing.   Neurological:  Negative for dizziness, light-headedness, passing out, loss of balance, headaches, altered mental status, loss of consciousness and seizures.   Hematologic/Lymphatic: Negative for swollen lymph nodes.   Psychiatric/Behavioral:  Positive for agitation. Negative for altered mental status and nervous/anxious. The patient is not nervous/anxious.       Objective:     Physical Exam   Constitutional: She is oriented to person, place, and time. She appears well-developed. She is cooperative.  Non-toxic appearance. She does not appear ill. No distress.   HENT:   Head: Normocephalic and atraumatic.   Ears:   Right Ear: External ear normal.   Left Ear: External ear normal.   Nose: Nose normal. No nasal deformity. No epistaxis.   Mouth/Throat: Uvula is midline, oropharynx is clear and moist and mucous membranes are normal.   Eyes: Conjunctivae and lids are normal. No scleral icterus.   Neck: Trachea normal and phonation normal. Neck supple. No edema present. No erythema present. No neck rigidity present.   Cardiovascular: Normal rate, regular rhythm, normal heart sounds and normal pulses.   Pulmonary/Chest: Effort normal and breath sounds normal. No accessory muscle usage or stridor. No respiratory distress. She has no decreased breath sounds. She has no wheezes. She has no rhonchi. She has no rales.   Abdominal: Normal appearance and bowel sounds are normal. Soft. There is no abdominal tenderness. There is no rebound, no guarding, no left CVA tenderness and no right CVA  tenderness.   Musculoskeletal: Normal range of motion.         General: No deformity. Normal range of motion.   Neurological: She is alert and oriented to person, place, and time. She exhibits normal muscle tone. Coordination normal.   Skin: Skin is warm, dry, intact, not diaphoretic, not pale and no rash. Capillary refill takes less than 2 seconds.   Psychiatric: Her speech is normal and behavior is normal. Judgment and thought content normal.   Nursing note and vitals reviewed.      Results for orders placed or performed in visit on 12/29/24   POCT Urinalysis(Instrument)    Collection Time: 12/29/24  3:24 PM   Result Value Ref Range    Color, POC UA Yellow Yellow, Straw, Colorless    Clarity, POC UA Clear Clear    Glucose, POC UA Negative Negative    Bilirubin, POC UA Small (A) Negative    Ketones, POC UA Trace (A) Negative    Spec Grav POC UA 1.020 1.005 - 1.030    Blood, POC UA Negative Negative    pH, POC UA 5.0 5.0 - 8.0    Protein, POC UA 30 (A) Negative    Urobilinogen, POC UA 0.2 <=1.0    Nitrite, POC UA Positive (A) Negative    WBC, POC UA Small (A) Negative     Assessment:     1. Urinary tract infection without hematuria, site unspecified    2. UTI symptoms        Plan:   Discussed UA results with patient/daughter. Discussed DDX and treatment options with patient - will go ahead and treat empirically with antibiotics due to patient's symptoms at this time. Patient reports possible PCN allergy, but has been able to take Cephalosporins RX in the past without anaphylaxis reaction, so will go ahead with Keflex RX pending Urine Culture results. Will contact daughter Analilia (per patient/daughter's request) with Urine Culture results (sending Urine Culture because patient is > 66 yo). Advised close follow-up with PCP and/or OBGYN and/or Urology for further evaluation as needed. ER precautions given to patient as well. Patient/daughter aware, verbalized understanding and agreed with plan of care.    Urinary tract  infection without hematuria, site unspecified  -     CULTURE, URINE    UTI symptoms  -     POCT Urinalysis(Instrument)  -     CULTURE, URINE    Other orders  -     cephALEXin (KEFLEX) 500 MG capsule; Take 1 capsule (500 mg total) by mouth every 6 (six) hours. for 7 days  Dispense: 28 capsule; Refill: 0

## 2024-12-30 LAB
BACTERIA UR CULT: NORMAL
BACTERIA UR CULT: NORMAL

## 2025-01-29 ENCOUNTER — TELEPHONE (OUTPATIENT)
Dept: RHEUMATOLOGY | Facility: CLINIC | Age: 89
End: 2025-01-29
Payer: MEDICARE

## 2025-01-29 NOTE — TELEPHONE ENCOUNTER
----- Message from Pharmacist Eva sent at 1/29/2025 10:24 AM CST -----  Please reach out to patient/daughter to have a CBC collected. Will be required before refills of Celebrex will be approved. Thanks.

## 2025-01-29 NOTE — TELEPHONE ENCOUNTER
Left voicemail for pts daughter to return call to get pt scheduled for labs in order to get her medication refill

## 2025-03-01 DIAGNOSIS — M05.9 SEROPOSITIVE RHEUMATOID ARTHRITIS: ICD-10-CM

## 2025-03-03 RX ORDER — METHOTREXATE 2.5 MG/1
15 TABLET ORAL
Qty: 78 TABLET | Refills: 0 | Status: SHIPPED | OUTPATIENT
Start: 2025-03-03

## 2025-04-23 ENCOUNTER — OFFICE VISIT (OUTPATIENT)
Dept: FAMILY MEDICINE | Facility: CLINIC | Age: 89
End: 2025-04-23
Payer: MEDICARE

## 2025-04-23 ENCOUNTER — LAB VISIT (OUTPATIENT)
Dept: LAB | Facility: HOSPITAL | Age: 89
End: 2025-04-23
Payer: MEDICARE

## 2025-04-23 VITALS
OXYGEN SATURATION: 97 % | DIASTOLIC BLOOD PRESSURE: 72 MMHG | SYSTOLIC BLOOD PRESSURE: 114 MMHG | HEART RATE: 78 BPM | BODY MASS INDEX: 27.22 KG/M2 | WEIGHT: 147.94 LBS | HEIGHT: 62 IN

## 2025-04-23 DIAGNOSIS — M54.50 CHRONIC BILATERAL LOW BACK PAIN WITHOUT SCIATICA: ICD-10-CM

## 2025-04-23 DIAGNOSIS — M81.0 OSTEOPOROSIS, POST-MENOPAUSAL: ICD-10-CM

## 2025-04-23 DIAGNOSIS — M25.50 POLYARTHRALGIA: ICD-10-CM

## 2025-04-23 DIAGNOSIS — E55.9 VITAMIN D DEFICIENCY: ICD-10-CM

## 2025-04-23 DIAGNOSIS — F32.89 OTHER DEPRESSION: ICD-10-CM

## 2025-04-23 DIAGNOSIS — I10 ESSENTIAL HYPERTENSION: ICD-10-CM

## 2025-04-23 DIAGNOSIS — M05.79 RHEUMATOID ARTHRITIS INVOLVING MULTIPLE SITES WITH POSITIVE RHEUMATOID FACTOR: ICD-10-CM

## 2025-04-23 DIAGNOSIS — E78.2 MIXED HYPERLIPIDEMIA: ICD-10-CM

## 2025-04-23 DIAGNOSIS — M05.9 SEROPOSITIVE RHEUMATOID ARTHRITIS: ICD-10-CM

## 2025-04-23 DIAGNOSIS — I25.10 ARTERIOSCLEROSIS OF CORONARY ARTERY: ICD-10-CM

## 2025-04-23 DIAGNOSIS — I49.5 SICK SINUS SYNDROME: ICD-10-CM

## 2025-04-23 DIAGNOSIS — G89.29 CHRONIC BILATERAL LOW BACK PAIN WITHOUT SCIATICA: ICD-10-CM

## 2025-04-23 DIAGNOSIS — Z79.899 LONG-TERM USE OF HIGH-RISK MEDICATION: ICD-10-CM

## 2025-04-23 DIAGNOSIS — M05.79 RHEUMATOID ARTHRITIS INVOLVING MULTIPLE SITES WITH POSITIVE RHEUMATOID FACTOR: Primary | ICD-10-CM

## 2025-04-23 PROBLEM — M06.9 RHEUMATOID ARTHRITIS: Status: ACTIVE | Noted: 2023-12-04

## 2025-04-23 PROBLEM — I44.7 LEFT BUNDLE BRANCH BLOCK: Status: ACTIVE | Noted: 2022-05-24

## 2025-04-23 PROBLEM — F34.1 DYSTHYMIA: Status: ACTIVE | Noted: 2023-12-04

## 2025-04-23 PROBLEM — K21.9 GASTROESOPHAGEAL REFLUX DISEASE WITHOUT ESOPHAGITIS: Status: ACTIVE | Noted: 2023-12-04

## 2025-04-23 PROBLEM — I35.0 AORTIC VALVE STENOSIS: Status: ACTIVE | Noted: 2020-08-19

## 2025-04-23 PROBLEM — Z78.0 POSTMENOPAUSAL STATUS: Status: ACTIVE | Noted: 2023-12-04

## 2025-04-23 PROBLEM — R09.89 CAROTID BRUIT: Status: ACTIVE | Noted: 2020-08-19

## 2025-04-23 LAB
25(OH)D3+25(OH)D2 SERPL-MCNC: 33 NG/ML (ref 30–96)
ALBUMIN SERPL BCP-MCNC: 3.3 G/DL (ref 3.5–5.2)
ALP SERPL-CCNC: 45 UNIT/L (ref 40–150)
ALT SERPL W/O P-5'-P-CCNC: <5 UNIT/L (ref 10–44)
ANION GAP (OHS): 12 MMOL/L (ref 8–16)
AST SERPL-CCNC: 11 UNIT/L (ref 11–45)
BILIRUB SERPL-MCNC: 0.4 MG/DL (ref 0.1–1)
BUN SERPL-MCNC: 17 MG/DL (ref 8–23)
CALCIUM SERPL-MCNC: 9.3 MG/DL (ref 8.7–10.5)
CHLORIDE SERPL-SCNC: 106 MMOL/L (ref 95–110)
CHOLEST SERPL-MCNC: 258 MG/DL (ref 120–199)
CHOLEST/HDLC SERPL: 5.3 {RATIO} (ref 2–5)
CO2 SERPL-SCNC: 23 MMOL/L (ref 23–29)
CREAT SERPL-MCNC: 0.8 MG/DL (ref 0.5–1.4)
CRP SERPL-MCNC: 3 MG/L
ERYTHROCYTE [SEDIMENTATION RATE] IN BLOOD BY PHOTOMETRIC METHOD: 6 MM/HR
FOLATE SERPL-MCNC: 18.3 NG/ML (ref 4–24)
GFR SERPLBLD CREATININE-BSD FMLA CKD-EPI: >60 ML/MIN/1.73/M2
GLUCOSE SERPL-MCNC: 117 MG/DL (ref 70–110)
HDLC SERPL-MCNC: 49 MG/DL (ref 40–75)
HDLC SERPL: 19 % (ref 20–50)
LDLC SERPL CALC-MCNC: 153.8 MG/DL (ref 63–159)
NONHDLC SERPL-MCNC: 209 MG/DL
POTASSIUM SERPL-SCNC: 4.2 MMOL/L (ref 3.5–5.1)
PROT SERPL-MCNC: 6.3 GM/DL (ref 6–8.4)
SODIUM SERPL-SCNC: 141 MMOL/L (ref 136–145)
TRIGL SERPL-MCNC: 276 MG/DL (ref 30–150)

## 2025-04-23 PROCEDURE — 36415 COLL VENOUS BLD VENIPUNCTURE: CPT | Mod: PN

## 2025-04-23 PROCEDURE — 80053 COMPREHEN METABOLIC PANEL: CPT

## 2025-04-23 PROCEDURE — 80061 LIPID PANEL: CPT

## 2025-04-23 PROCEDURE — 86140 C-REACTIVE PROTEIN: CPT

## 2025-04-23 PROCEDURE — 82306 VITAMIN D 25 HYDROXY: CPT

## 2025-04-23 PROCEDURE — 82746 ASSAY OF FOLIC ACID SERUM: CPT

## 2025-04-23 PROCEDURE — 85652 RBC SED RATE AUTOMATED: CPT

## 2025-04-23 PROCEDURE — 99999 PR PBB SHADOW E&M-EST. PATIENT-LVL IV: CPT | Mod: PBBFAC,,, | Performed by: STUDENT IN AN ORGANIZED HEALTH CARE EDUCATION/TRAINING PROGRAM

## 2025-04-23 RX ORDER — ASPIRIN 81 MG/1
1 TABLET ORAL DAILY
COMMUNITY

## 2025-04-23 RX ORDER — CELECOXIB 100 MG/1
100 CAPSULE ORAL DAILY PRN
Qty: 90 CAPSULE | Refills: 3 | Status: SHIPPED | OUTPATIENT
Start: 2025-04-23 | End: 2026-04-18

## 2025-04-23 RX ORDER — SERTRALINE HYDROCHLORIDE 50 MG/1
50 TABLET, FILM COATED ORAL DAILY
Qty: 90 TABLET | Refills: 3 | Status: SHIPPED | OUTPATIENT
Start: 2025-04-23 | End: 2026-04-18

## 2025-04-23 NOTE — PROGRESS NOTES
Plan:      Denisa was seen today for [facility].    Diagnoses and all orders for this visit:    Rheumatoid arthritis involving multiple sites with positive rheumatoid factor  -     celecoxib (CELEBREX) 100 MG capsule; Take 1 capsule (100 mg total) by mouth daily as needed for Pain.  -     C-Reactive Protein; Future  -     Sedimentation rate; Future    Long-term use of high-risk medication  -     Folate; Future  -     Ambulatory referral/consult to Optometry; Future    Polyarthralgia  -     celecoxib (CELEBREX) 100 MG capsule; Take 1 capsule (100 mg total) by mouth daily as needed for Pain.    Seropositive rheumatoid arthritis  -     celecoxib (CELEBREX) 100 MG capsule; Take 1 capsule (100 mg total) by mouth daily as needed for Pain.  -     C-Reactive Protein; Future  -     Sedimentation rate; Future    Chronic bilateral low back pain without sciatica  -     celecoxib (CELEBREX) 100 MG capsule; Take 1 capsule (100 mg total) by mouth daily as needed for Pain.  -     Ambulatory Referral/Consult to Physical Therapy    Other depression  -     sertraline (ZOLOFT) 50 MG tablet; Take 1 tablet (50 mg total) by mouth once daily.    Arteriosclerosis of coronary artery  -     Lipid Panel; Future  -     Comprehensive Metabolic Panel; Future    Essential hypertension  -     Comprehensive Metabolic Panel; Future    Mixed hyperlipidemia  -     Lipid Panel; Future  -     Comprehensive Metabolic Panel; Future    Sick sinus syndrome    Osteoporosis, post-menopausal  -     Comprehensive Metabolic Panel; Future  -     Vitamin D; Future    Vitamin D deficiency  -     Comprehensive Metabolic Panel; Future  -     Vitamin D; Future      Follow up in about 3 months (around 7/23/2025), or if symptoms worsen or fail to improve.    Nataly Cochran MD  04/23/2025    Subjective:      Patient ID: Denisa Craft is a 89 y.o. female    Chief Complaint   Patient presents with    [facility] care      HPI  89 y.o. female with a PMHx  as documented below presents to clinic today for the following:    Establish care - previous PCP retired.     MUSCULOSKELETAL:  She reports recent Hx of moderate-severe lower back pain. She previously took Celebrex for arthritis pain management with good effect but is currently out of medication. She expresses interest in physical therapy for lower back pain management.    SOCIAL HISTORY:  She lives at home with sister who assists with medication management and has five children who provide family support.    ENT:  She has documented hearing loss with history of losing hearing aids, including an unreturned loaner pair.  _________________________    Follows w/ Dr. Tenorio w/ Cardiology. Previously following w/ Dr. Isai Ta w/ Rheumatology - upcoming appt w/ Dr. Rahman on 6/25/25.    Dysthymia:   - Zoloft 50 mg daily    Hx of aortic valve stenosis:   - S/p TAVR    CAD:  - ASA 81 mg daily, Plavix 75 mg daily    HTN:   - Amlodipine 2.5 mg daily   - Losartan 100 mg daily   - Coreg 12.5 mg BID  - Sotalol 120 mg BID    HLD:   - Previously on statin, stopped 2/2 myalgias, repeat labs not yet completed    Hx of sick sinus syndrome:   - S/p pacemaker placement    RA:   - Methotrexate 15 mg every 7 days, folic acid 1 mg daily  - Previous Rx: steroid taper (5/2024)  - Celebrex 100 mg daily PRN    Osteoporosis:   - Prolia q6 months    Vit D deficiency:   - Vit D3 50,000 units weekly    ROS  Constitutional:  Negative for chills and fever.   Respiratory:  Negative for shortness of breath.    Cardiovascular:  Negative for chest pain.   Gastrointestinal:  Negative for abdominal pain, constipation, diarrhea, nausea and vomiting.     Current Outpatient Medications   Medication Instructions    acetaminophen (TYLENOL) 1,000 mg, 2 times daily    amLODIPine (NORVASC) 2.5 mg, Daily    aspirin (ECOTRIN) 81 MG EC tablet 1 tablet, Daily    carvediloL (COREG) 12.5 mg, 2 times daily    celecoxib (CELEBREX) 100 mg, Oral, Daily PRN     "clopidogreL (PLAVIX) 75 mg, Daily    diclofenac sodium (VOLTAREN ARTHRITIS PAIN) 2 g, Topical (Top), 4 times daily    ergocalciferol (ERGOCALCIFEROL) 50,000 unit Cap Take by mouth.    folic acid (FOLVITE) 1 mg, Oral, Daily    losartan (COZAAR) 100 mg, Daily    methotrexate 15 mg, Oral, Every 7 days    predniSONE (DELTASONE) 5 MG tablet Take 10mg for 2 weeks, then 7.5 mg for 2 weeks and then 5 mg daily thereafter    PROLIA 60 mg, Subcutaneous, Every 6 months    sertraline (ZOLOFT) 50 mg, Oral, Daily    sotaloL (BETAPACE) 120 mg, 2 times daily      Past Medical History:   Diagnosis Date    Coronary artery disease     GERD (gastroesophageal reflux disease)     Hyperlipidemia     Hypertension     Vitamin D deficiency disease         Objective:      Vitals:    04/23/25 0937   BP: 114/72   Pulse: 78   SpO2: 97%   Weight: 67.1 kg (147 lb 14.9 oz)   Height: 5' 2" (1.575 m)     Body mass index is 27.06 kg/m².    Physical Exam   Constitutional:       General: No acute distress.  HENT:      Head: Normocephalic and atraumatic.   Pulmonary:      Effort: Pulmonary effort is normal. No respiratory distress.   Neurological:      General: No focal deficit present.      Mental Status: Alert and oriented to person, place, and time. Mental status is at baseline.    Assessment:       1. Rheumatoid arthritis involving multiple sites with positive rheumatoid factor    2. Long-term use of high-risk medication    3. Polyarthralgia    4. Seropositive rheumatoid arthritis    5. Chronic bilateral low back pain without sciatica    6. Other depression    7. Arteriosclerosis of coronary artery    8. Essential hypertension    9. Mixed hyperlipidemia    10. Sick sinus syndrome    11. Osteoporosis, post-menopausal    12. Vitamin D deficiency        Nataly Cochran MD  [facility]  Office: [phone]   Fax: [phone]  04/23/2025      Disclaimer: This note was partly generated using dictation software which may " occasionally result in transcription errors.    Total time spent on this encounter includes face to face time and non-face to face time preparing to see the patient (eg, review of tests), obtaining and/or reviewing separately obtained history, documenting clinical information in the electronic or other health record, independently interpreting results, and communicating results to the patient/family/caregiver, or care coordinator.      Visit today included increased complexity associated with the care of the episodic problem (see above) addressed and managing the longitudinal care of the patient due to the serious and/or complex managed problem(s) (see above).

## 2025-04-25 ENCOUNTER — RESULTS FOLLOW-UP (OUTPATIENT)
Dept: FAMILY MEDICINE | Facility: CLINIC | Age: 89
End: 2025-04-25

## 2025-04-25 NOTE — PROGRESS NOTES
Please call the patient and have them schedule an appointment at their earliest convenience to discuss their lab results (virtual okay). Thanks!

## 2025-04-29 ENCOUNTER — TELEPHONE (OUTPATIENT)
Dept: FAMILY MEDICINE | Facility: CLINIC | Age: 89
End: 2025-04-29
Payer: MEDICARE

## 2025-04-29 NOTE — TELEPHONE ENCOUNTER
Spoke w/ POA daughter Analilia Randall, she verified she can assist patient with virtual visit to go over recent lab results, verified daughter has [facility]. Scheduled virtual for patient on 5/1/2025 @10:00am per 's request.

## 2025-04-29 NOTE — TELEPHONE ENCOUNTER
----- Message from Nataly Cochran MD sent at 4/25/2025  1:07 PM CDT -----  Please call the patient and have them schedule an appointment at their earliest convenience to discuss their lab results (virtual okay). Thanks!  ----- Message -----  From: Lab, Background User  Sent: 4/23/2025   8:37 PM CDT  To: Nataly Cochran MD

## 2025-05-01 ENCOUNTER — OFFICE VISIT (OUTPATIENT)
Dept: FAMILY MEDICINE | Facility: CLINIC | Age: 89
End: 2025-05-01
Payer: MEDICARE

## 2025-05-01 DIAGNOSIS — M25.50 POLYARTHRALGIA: ICD-10-CM

## 2025-05-01 DIAGNOSIS — M05.79 RHEUMATOID ARTHRITIS INVOLVING MULTIPLE SITES WITH POSITIVE RHEUMATOID FACTOR: ICD-10-CM

## 2025-05-01 DIAGNOSIS — M05.9 SEROPOSITIVE RHEUMATOID ARTHRITIS: ICD-10-CM

## 2025-05-01 DIAGNOSIS — E78.2 MIXED HYPERLIPIDEMIA: Primary | ICD-10-CM

## 2025-05-01 DIAGNOSIS — G89.29 CHRONIC BILATERAL LOW BACK PAIN WITHOUT SCIATICA: ICD-10-CM

## 2025-05-01 DIAGNOSIS — M54.50 CHRONIC BILATERAL LOW BACK PAIN WITHOUT SCIATICA: ICD-10-CM

## 2025-05-01 RX ORDER — EZETIMIBE 10 MG/1
10 TABLET ORAL DAILY
Qty: 90 TABLET | Refills: 3 | Status: SHIPPED | OUTPATIENT
Start: 2025-05-01 | End: 2026-05-01

## 2025-05-01 RX ORDER — CELECOXIB 100 MG/1
100 CAPSULE ORAL 2 TIMES DAILY PRN
Qty: 180 CAPSULE | Refills: 3 | Status: SHIPPED | OUTPATIENT
Start: 2025-05-01 | End: 2026-04-26

## 2025-05-01 NOTE — PROGRESS NOTES
Assessment and Plan:    Diagnoses and all orders for this visit:    Mixed hyperlipidemia  -     ezetimibe (ZETIA) 10 mg tablet; Take 1 tablet (10 mg total) by mouth once daily.  -     Comprehensive Metabolic Panel; Future    Rheumatoid arthritis involving multiple sites with positive rheumatoid factor  -     celecoxib (CELEBREX) 100 MG capsule; Take 1 capsule (100 mg total) by mouth 2 (two) times daily as needed for Pain.    Seropositive rheumatoid arthritis  -     celecoxib (CELEBREX) 100 MG capsule; Take 1 capsule (100 mg total) by mouth 2 (two) times daily as needed for Pain.    Polyarthralgia  -     celecoxib (CELEBREX) 100 MG capsule; Take 1 capsule (100 mg total) by mouth 2 (two) times daily as needed for Pain.    Chronic bilateral low back pain without sciatica  -     celecoxib (CELEBREX) 100 MG capsule; Take 1 capsule (100 mg total) by mouth 2 (two) times daily as needed for Pain.    Repeat CMP in 6 weeks.     Follow up in about 3 months (around 8/1/2025), or if symptoms worsen or fail to improve.    Nataly Cochran MD  05/01/2025     Audiovisual Telehealth Visit:     The patient location is: Home  The chief complaint leading to consultation is: (documented below in HPI)  Visit type: Virtual visit with audiovisual  Total time spent on this encounter: 20 minutes.This includes face to face time and non-face to face time preparing to see the patient (eg, review of tests), obtaining and/or reviewing separately obtained history, documenting clinical information in the electronic or other health record, independently interpreting results, and communicating results to the patient/family/caregiver, or care coordinator.       Each patient to whom I provide medical services by telemedicine is: (1) informed of the relationship between the physician and patient and the respective role of any other health care provider with respect to management of the patient; and (2) notified that they may decline to receive medical  services by telemedicine and may withdraw from such care at any time. Patient verbally consented to receive this service via audiovisual call.    Patient ID: Denisa Craft is a 89 y.o. female     HPI: 89 y.o. female with a PMHx as documented below presents to clinic today (via audiovisual telehealth visit) for the following:    Discussed pt's current condition and plan of care with her daughter and emergency contact, Analilia, as pt is hard of hearing.     Reviewed most recent test results - all questions answered, pt expressed understanding.    Discussed Zetia vs Repatha for treatment of HLD and cardiovascular disease - pt's daughter reports daily oral medication would be easier to manage than injection.     Discussed increase in Celebrex to better control pain.    Past Medical History:   Diagnosis Date    Coronary artery disease     GERD (gastroesophageal reflux disease)     Hyperlipidemia     Hypertension     Vitamin D deficiency disease      Review of Systems   HENT:  Negative for hearing loss.    Eyes:  Negative for discharge.   Respiratory:  Negative for wheezing.    Cardiovascular:  Negative for chest pain and palpitations.   Gastrointestinal:  Negative for blood in stool, constipation, diarrhea and vomiting.   Genitourinary:  Negative for dysuria and hematuria.   Musculoskeletal:  Negative for neck pain.   Neurological:  Negative for weakness and headaches.   Endo/Heme/Allergies:  Negative for polydipsia.     Answers submitted by the patient for this visit:  Review of Systems Questionnaire (Submitted on 5/1/2025)  activity change: No  unexpected weight change: No  rhinorrhea: No  trouble swallowing: No  visual disturbance: No  chest tightness: No  polyuria: No  difficulty urinating: No  menstrual problem: No  joint swelling: No  arthralgias: No  confusion: No  dysphoric mood: No    Physical Exam:  Constitutional:       General: No acute distress.  HENT:      Head: Normocephalic and atraumatic.   Pulmonary:       Effort: Pulmonary effort is normal. No respiratory distress.   Neurological:      General: No focal deficit present.      Mental Status: Alert and oriented to person, place, and time. Mental status is at baseline.     Assessment and Plan:   See above    Nataly Cochran MD  [facility]  Office: [phone]   Fax: [phone]  05/01/2025       Disclaimer: This note was partly generated using dictation software which may occasionally result in transcription errors.    Visit today included increased complexity associated with the care of the episodic problem (see above) addressed and managing the longitudinal care of the patient due to the serious and/or complex managed problem(s) (see above).      [Normal Sphincter Tone] : normal sphincter tone [No Mass] : no mass [Epididymis] : the epididymides were normal [Testes Tenderness] : no tenderness of the testes [Testes Mass (___cm)] : there were no testicular masses [Normal] : affect was normal and insight and judgment were intact [de-identified] : keratosis

## 2025-05-16 DIAGNOSIS — M05.9 SEROPOSITIVE RHEUMATOID ARTHRITIS: ICD-10-CM

## 2025-05-16 RX ORDER — METHOTREXATE 2.5 MG/1
15 TABLET ORAL
Qty: 78 TABLET | Refills: 0 | Status: SHIPPED | OUTPATIENT
Start: 2025-05-16

## 2025-06-11 ENCOUNTER — OFFICE VISIT (OUTPATIENT)
Dept: OPTOMETRY | Facility: CLINIC | Age: 89
End: 2025-06-11
Payer: MEDICARE

## 2025-06-11 DIAGNOSIS — Z96.1 PSEUDOPHAKIA OF BOTH EYES: ICD-10-CM

## 2025-06-11 DIAGNOSIS — Z79.899 LONG-TERM USE OF HIGH-RISK MEDICATION: ICD-10-CM

## 2025-06-11 DIAGNOSIS — H52.4 HYPEROPIA WITH ASTIGMATISM AND PRESBYOPIA, BILATERAL: ICD-10-CM

## 2025-06-11 DIAGNOSIS — M06.9 RHEUMATOID ARTHRITIS, INVOLVING UNSPECIFIED SITE, UNSPECIFIED WHETHER RHEUMATOID FACTOR PRESENT: ICD-10-CM

## 2025-06-11 DIAGNOSIS — H52.03 HYPEROPIA WITH ASTIGMATISM AND PRESBYOPIA, BILATERAL: ICD-10-CM

## 2025-06-11 DIAGNOSIS — H04.123 DRY EYE SYNDROME OF BILATERAL LACRIMAL GLANDS: Primary | ICD-10-CM

## 2025-06-11 DIAGNOSIS — H52.203 HYPEROPIA WITH ASTIGMATISM AND PRESBYOPIA, BILATERAL: ICD-10-CM

## 2025-06-11 PROCEDURE — 1126F AMNT PAIN NOTED NONE PRSNT: CPT | Mod: CPTII,S$GLB,,

## 2025-06-11 PROCEDURE — 99204 OFFICE O/P NEW MOD 45 MIN: CPT | Mod: S$GLB,,,

## 2025-06-11 PROCEDURE — 1101F PT FALLS ASSESS-DOCD LE1/YR: CPT | Mod: CPTII,S$GLB,,

## 2025-06-11 PROCEDURE — 99999 PR PBB SHADOW E&M-EST. PATIENT-LVL III: CPT | Mod: PBBFAC,,,

## 2025-06-11 PROCEDURE — 3288F FALL RISK ASSESSMENT DOCD: CPT | Mod: CPTII,S$GLB,,

## 2025-06-11 PROCEDURE — 1159F MED LIST DOCD IN RCRD: CPT | Mod: CPTII,S$GLB,,

## 2025-06-11 PROCEDURE — 1157F ADVNC CARE PLAN IN RCRD: CPT | Mod: CPTII,S$GLB,,

## 2025-06-11 NOTE — PROGRESS NOTES
HPI    Referral - ocular health visit [facility] 5-8 years (unsure)    Pt needs to establish care w clinic. Pt notices increase in floaters, pt   denies any changes in va w specs but needs updated Rx. Pt denies flashes   and gtts.   Last edited by Kennedi Garcia on 6/11/2025  2:16 PM.            Assessment /Plan     For exam results, see Encounter Report.    Dry eye syndrome of bilateral lacrimal glands    Rheumatoid arthritis, involving unspecified site, unspecified whether rheumatoid factor present    Long-term use of high-risk medication  -     Ambulatory referral/consult to Optometry    Pseudophakia of both eyes    Hyperopia with astigmatism and presbyopia, bilateral      Dense, diffuse SPK OS>OD. Pt largely asymptomatic. Ed pt on nature and chronicity of dry eye. Gave pt OTC artificial tear recommendations and advised pt to instill BID-QID OU daily for relief. Ed pt to call or message if any worsening signs or symptoms and can discuss further treatment options. Otherwise, monitor yearly for changes.    2-3. Pt on Celebrex for Rheumatoid Arthritis. No ocular complications of medication noted OD, OS. Continue to monitor yearly for changes for as long as pt is on medication, sooner if any changes in vision or other issues arise.    4. PCIOL OU. Stable. Monitor yearly for changes.    5. Discussed spectacle options with pt and released final spec rx. Ed pt on change in rx and adaptation.    RTC: 1 year for comprehensive exam or sooner prn

## 2025-06-12 DIAGNOSIS — M81.0 OSTEOPOROSIS, POST-MENOPAUSAL: ICD-10-CM

## 2025-06-12 RX ORDER — DENOSUMAB 60 MG/ML
60 INJECTION SUBCUTANEOUS
Qty: 1 ML | Refills: 1 | Status: CANCELLED | OUTPATIENT
Start: 2025-06-12 | End: 2026-06-12

## 2025-06-18 ENCOUNTER — LAB VISIT (OUTPATIENT)
Dept: LAB | Facility: HOSPITAL | Age: 89
End: 2025-06-18
Attending: STUDENT IN AN ORGANIZED HEALTH CARE EDUCATION/TRAINING PROGRAM
Payer: MEDICARE

## 2025-06-18 DIAGNOSIS — Z51.81 MEDICATION MONITORING ENCOUNTER: ICD-10-CM

## 2025-06-18 LAB
ALBUMIN SERPL BCP-MCNC: 3.6 G/DL (ref 3.5–5.2)
ALP SERPL-CCNC: 43 UNIT/L (ref 40–150)
ALT SERPL W/O P-5'-P-CCNC: 13 UNIT/L (ref 10–44)
ANION GAP (OHS): 16 MMOL/L (ref 8–16)
AST SERPL-CCNC: 15 UNIT/L (ref 11–45)
BILIRUB SERPL-MCNC: 0.4 MG/DL (ref 0.1–1)
BUN SERPL-MCNC: 17 MG/DL (ref 8–23)
CALCIUM SERPL-MCNC: 9.5 MG/DL (ref 8.7–10.5)
CHLORIDE SERPL-SCNC: 105 MMOL/L (ref 95–110)
CO2 SERPL-SCNC: 19 MMOL/L (ref 23–29)
CREAT SERPL-MCNC: 0.8 MG/DL (ref 0.5–1.4)
GFR SERPLBLD CREATININE-BSD FMLA CKD-EPI: >60 ML/MIN/1.73/M2
GLUCOSE SERPL-MCNC: 126 MG/DL (ref 70–110)
POTASSIUM SERPL-SCNC: 5 MMOL/L (ref 3.5–5.1)
PROT SERPL-MCNC: 6.6 GM/DL (ref 6–8.4)
SODIUM SERPL-SCNC: 140 MMOL/L (ref 136–145)

## 2025-06-18 PROCEDURE — 80053 COMPREHEN METABOLIC PANEL: CPT | Mod: PO

## 2025-06-18 PROCEDURE — 36415 COLL VENOUS BLD VENIPUNCTURE: CPT | Mod: PO

## 2025-06-24 ENCOUNTER — TELEPHONE (OUTPATIENT)
Dept: RHEUMATOLOGY | Facility: CLINIC | Age: 89
End: 2025-06-24
Payer: MEDICARE

## 2025-06-25 ENCOUNTER — INFUSION (OUTPATIENT)
Dept: INFUSION THERAPY | Facility: HOSPITAL | Age: 89
End: 2025-06-25
Attending: STUDENT IN AN ORGANIZED HEALTH CARE EDUCATION/TRAINING PROGRAM
Payer: MEDICARE

## 2025-06-25 ENCOUNTER — OFFICE VISIT (OUTPATIENT)
Dept: RHEUMATOLOGY | Facility: CLINIC | Age: 89
End: 2025-06-25
Payer: MEDICARE

## 2025-06-25 VITALS
WEIGHT: 149.69 LBS | DIASTOLIC BLOOD PRESSURE: 66 MMHG | HEIGHT: 62 IN | SYSTOLIC BLOOD PRESSURE: 99 MMHG | BODY MASS INDEX: 27.55 KG/M2 | HEART RATE: 83 BPM

## 2025-06-25 VITALS
BODY MASS INDEX: 27.55 KG/M2 | WEIGHT: 149.69 LBS | RESPIRATION RATE: 18 BRPM | HEART RATE: 83 BPM | HEIGHT: 62 IN | SYSTOLIC BLOOD PRESSURE: 99 MMHG | DIASTOLIC BLOOD PRESSURE: 66 MMHG

## 2025-06-25 DIAGNOSIS — Z51.81 MEDICATION MONITORING ENCOUNTER: ICD-10-CM

## 2025-06-25 DIAGNOSIS — M81.0 OSTEOPOROSIS, POST-MENOPAUSAL: Primary | ICD-10-CM

## 2025-06-25 DIAGNOSIS — D84.821 IMMUNOSUPPRESSION DUE TO DRUG THERAPY: ICD-10-CM

## 2025-06-25 DIAGNOSIS — Z79.899 IMMUNOSUPPRESSION DUE TO DRUG THERAPY: ICD-10-CM

## 2025-06-25 DIAGNOSIS — R52 BREAKTHROUGH PAIN: ICD-10-CM

## 2025-06-25 DIAGNOSIS — Z71.89 COUNSELING ON HEALTH PROMOTION AND DISEASE PREVENTION: ICD-10-CM

## 2025-06-25 DIAGNOSIS — M05.9 SEROPOSITIVE RHEUMATOID ARTHRITIS: Primary | ICD-10-CM

## 2025-06-25 DIAGNOSIS — Z79.52 LONG TERM CURRENT USE OF SYSTEMIC STEROIDS: ICD-10-CM

## 2025-06-25 DIAGNOSIS — M81.0 OSTEOPOROSIS, POST-MENOPAUSAL: ICD-10-CM

## 2025-06-25 PROCEDURE — 63600175 PHARM REV CODE 636 W HCPCS: Mod: JZ,TB | Performed by: INTERNAL MEDICINE

## 2025-06-25 PROCEDURE — 1159F MED LIST DOCD IN RCRD: CPT | Mod: CPTII,S$GLB,, | Performed by: INTERNAL MEDICINE

## 2025-06-25 PROCEDURE — 96372 THER/PROPH/DIAG INJ SC/IM: CPT

## 2025-06-25 PROCEDURE — 99215 OFFICE O/P EST HI 40 MIN: CPT | Mod: S$GLB,,, | Performed by: INTERNAL MEDICINE

## 2025-06-25 PROCEDURE — 3288F FALL RISK ASSESSMENT DOCD: CPT | Mod: CPTII,S$GLB,, | Performed by: INTERNAL MEDICINE

## 2025-06-25 PROCEDURE — 1157F ADVNC CARE PLAN IN RCRD: CPT | Mod: CPTII,S$GLB,, | Performed by: INTERNAL MEDICINE

## 2025-06-25 PROCEDURE — 1101F PT FALLS ASSESS-DOCD LE1/YR: CPT | Mod: CPTII,S$GLB,, | Performed by: INTERNAL MEDICINE

## 2025-06-25 PROCEDURE — 99999 PR PBB SHADOW E&M-EST. PATIENT-LVL III: CPT | Mod: PBBFAC,,, | Performed by: INTERNAL MEDICINE

## 2025-06-25 PROCEDURE — 1126F AMNT PAIN NOTED NONE PRSNT: CPT | Mod: CPTII,S$GLB,, | Performed by: INTERNAL MEDICINE

## 2025-06-25 PROCEDURE — G2211 COMPLEX E/M VISIT ADD ON: HCPCS | Mod: S$GLB,,, | Performed by: INTERNAL MEDICINE

## 2025-06-25 RX ORDER — FOLIC ACID 1 MG/1
1 TABLET ORAL DAILY
Qty: 90 TABLET | Refills: 3 | Status: SHIPPED | OUTPATIENT
Start: 2025-06-25

## 2025-06-25 RX ORDER — METHOTREXATE 2.5 MG/1
20 TABLET ORAL
Qty: 32 TABLET | Refills: 3 | Status: SHIPPED | OUTPATIENT
Start: 2025-06-25 | End: 2025-07-25

## 2025-06-25 RX ORDER — PREDNISONE 5 MG/1
5 TABLET ORAL DAILY PRN
Qty: 60 TABLET | Refills: 1 | Status: SHIPPED | OUTPATIENT
Start: 2025-06-25

## 2025-06-25 RX ADMIN — DENOSUMAB 60 MG: 60 INJECTION SUBCUTANEOUS at 01:06

## 2025-06-25 NOTE — DISCHARGE INSTRUCTIONS
[facility]  [id number]  [address]  [phone] phone     [phone] fax  Hours of Operation: Monday- Friday 8:00am- 5:00pm  After hours phone  [phone]  Hematology / Oncology Physicians on call      ABDON [address] NP Phaon Dunbar, NP Khelsea Conley, FNP    Please call with any concerns regarding your appointment today.

## 2025-06-25 NOTE — NURSING
Patient and patient daughter said this was not her first time to get this so they opted not to sta 15 minutes afterwards. Tolerated injection well. Next appointments made.

## 2025-06-25 NOTE — PROGRESS NOTES
RHEUMATOLOGY OUTPATIENT CLINIC NOTE    6/25/2025    Attending Rheumatologist: Tom Rahman  Primary Care Provider/Physician Requesting Consultation: Nataly Cochran MD   Chief Complaint/Reason For Consultation:  Rheumatoid Arthritis and Osteoporosis      Subjective:     Denisa Craft is a 89 y.o. White female with SPRA, OP    Adherence w/ MTX, currently at 15mg po q7d.  Notable positive response to DMARD.  Refractory arthralgias w/ mixed pain characteristics.  Responsive to PDN at 5mg QD.  Tolerating Prolia q6m w/o side effects.  No fractures since last visit.  No upcoming invasive dental w/u.    Review of Systems   Constitutional:  Negative for fever.   Eyes:  Negative for photophobia and pain.   Respiratory:  Negative for cough and shortness of breath.    Cardiovascular:  Negative for chest pain.   Gastrointestinal:  Negative for blood in stool and melena.   Genitourinary:  Negative for dysuria and urgency.   Musculoskeletal:  Positive for joint pain.   Skin:  Negative for rash.   Neurological:  Negative for focal weakness.       Chronic comorbid conditions affecting medical decision making today:  Past Medical History:   Diagnosis Date    Coronary artery disease     GERD (gastroesophageal reflux disease)     Hyperlipidemia     Hypertension     Vitamin D deficiency disease      Past Surgical History:   Procedure Laterality Date    abdominal abscess      ANKLE FRACTURE SURGERY Left     APPENDECTOMY      HYSTERECTOMY      INSERTION OF PACEMAKER       Family History   Problem Relation Name Age of Onset    Heart disease Mother      Rheum arthritis Sister       Tobacco Use History[1]  Current Medications[2]     Objective:     Vitals:    06/25/25 1207   BP: 99/66   Pulse: 83     Physical Exam   Eyes: Conjunctivae are normal.   Pulmonary/Chest: Effort normal. No respiratory distress.   Musculoskeletal:         General: Tenderness and deformity present. No swelling. Normal range of motion.   Neurological: She  displays no weakness.   Skin: No rash noted.       Reviewed available old and all outside pertinent medical records available.    All lab results personally reviewed and interpreted by me.       ASSESSMENT / PLAN     1. Seropositive rheumatoid arthritis  CDAI: moderate disease activity.  Increase MTX to 20mg q7d, monitor response after 12w.  methotrexate 2.5 MG Tab    folic acid (FOLVITE) 1 MG tablet    C-Reactive Protein      2. Breakthrough pain  predniSONE (DELTASONE) 5 MG tablet      3. Osteoporosis, post-menopausal  Prolia q6m.      4. Medication monitoring encounter  Comprehensive Metabolic Panel      5. Immunosuppression due to drug therapy  CBC Auto Differential      6. Long term current use of systemic steroids  Attempt to limit PDN use to PRN in events of RA flare      7. Counseling on health promotion and disease prevention  Ca/vit D supp /400mg BID to target.              Visit today included increased complexity associated with the care of the episodic problem Seropositive rheumatoid arthritis addressed and managing the longitudinal care of the patient due to the serious and/or complex managed problem(s) immunosuppression due to drug therapy.    45 minutes of total time spent on the encounter, which includes face to face time and non-face to face time preparing to see the patient (eg, review of tests), Obtaining and/or reviewing separately obtained history, Documenting clinical information in the electronic or other health record, Independently interpreting results (not separately reported) and communicating results to the patient/family/caregiver, or Care coordination (not separately reported).     Tom Rahman M.D.           [1]   Social History  Tobacco Use   Smoking Status Former    Current packs/day: 0.00    Types: Cigarettes    Quit date:     Years since quittin.4    Passive exposure: Past   Smokeless Tobacco Never   [2]   Current Outpatient Medications:     acetaminophen  (TYLENOL) 500 MG tablet, Take 1,000 mg by mouth 2 (two) times a day., Disp: , Rfl:     amLODIPine (NORVASC) 2.5 MG tablet, Take 2.5 mg by mouth once daily., Disp: , Rfl:     aspirin (ECOTRIN) 81 MG EC tablet, Take 1 tablet by mouth once daily., Disp: , Rfl:     carvediloL (COREG) 12.5 MG tablet, Take 12.5 mg by mouth 2 (two) times daily., Disp: , Rfl:     celecoxib (CELEBREX) 100 MG capsule, Take 1 capsule (100 mg total) by mouth 2 (two) times daily as needed for Pain., Disp: 180 capsule, Rfl: 3    clopidogreL (PLAVIX) 75 mg tablet, Take 75 mg by mouth once daily., Disp: , Rfl:     diclofenac sodium (VOLTAREN ARTHRITIS PAIN) 1 % Gel, Apply 2 g topically 4 (four) times daily., Disp: 100 g, Rfl: 3    ergocalciferol (ERGOCALCIFEROL) 50,000 unit Cap, Take by mouth., Disp: , Rfl:     ezetimibe (ZETIA) 10 mg tablet, Take 1 tablet (10 mg total) by mouth once daily., Disp: 90 tablet, Rfl: 3    losartan (COZAAR) 100 MG tablet, Take 100 mg by mouth once daily., Disp: , Rfl:     sertraline (ZOLOFT) 50 MG tablet, Take 1 tablet (50 mg total) by mouth once daily., Disp: 90 tablet, Rfl: 3    sotaloL (BETAPACE) 120 MG Tab, Take 120 mg by mouth 2 (two) times daily., Disp: , Rfl:     folic acid (FOLVITE) 1 MG tablet, Take 1 tablet (1 mg total) by mouth once daily., Disp: 90 tablet, Rfl: 3    methotrexate 2.5 MG Tab, Take 8 tablets (20 mg total) by mouth every 7 days., Disp: 32 tablet, Rfl: 3    predniSONE (DELTASONE) 5 MG tablet, Take 1 tablet (5 mg total) by mouth daily as needed (rheumatoid related pain)., Disp: 60 tablet, Rfl: 1

## 2025-06-26 ENCOUNTER — PATIENT MESSAGE (OUTPATIENT)
Dept: FAMILY MEDICINE | Facility: CLINIC | Age: 89
End: 2025-06-26
Payer: MEDICARE

## 2025-06-30 ENCOUNTER — TELEPHONE (OUTPATIENT)
Dept: FAMILY MEDICINE | Facility: CLINIC | Age: 89
End: 2025-06-30
Payer: MEDICARE

## 2025-06-30 NOTE — TELEPHONE ENCOUNTER
Copied from CRM #[id number]. Topic: General Inquiry - Patient Advice  >> Jun 30, 2025 12:23 PM Rosmery wrote:  Type:  Needs Medical Advice    Who Called: Pt sister   Would the patient rather a call back or a response via [facility]? Call back  Best Call Back Number:  [phone]  Additional Information: pt Sister calling in regards to handicap sticker/ tag please call back and leave  message if she doesn't answer to let her know if it can be picked up at

## 2025-07-11 ENCOUNTER — PATIENT MESSAGE (OUTPATIENT)
Dept: FAMILY MEDICINE | Facility: CLINIC | Age: 89
End: 2025-07-11
Payer: MEDICARE

## 2025-07-14 RX ORDER — LOSARTAN POTASSIUM 100 MG/1
100 TABLET ORAL DAILY
Qty: 90 TABLET | Refills: 3 | Status: SHIPPED | OUTPATIENT
Start: 2025-07-14 | End: 2026-07-09

## 2025-07-14 NOTE — TELEPHONE ENCOUNTER
Care Due:                  Date            Visit Type   Department     Provider  --------------------------------------------------------------------------------                                ESTABLISHED                              PATIENT -    [facility] FAMILY  Last Visit: 05-      [email or web address]      MEDICINE       Nataly Cochran  Next Visit: None Scheduled  None         None Found                                                            Last  Test          Frequency    Reason                     Performed    Due Date  --------------------------------------------------------------------------------    CBC.........  12 months..  celecoxib................  05- 05-    [facility] Embedded Care Due Messages. Reference number: [id number].   7/14/2025 11:20:46 AM CDT